# Patient Record
Sex: FEMALE | Race: OTHER | NOT HISPANIC OR LATINO
[De-identification: names, ages, dates, MRNs, and addresses within clinical notes are randomized per-mention and may not be internally consistent; named-entity substitution may affect disease eponyms.]

---

## 2021-03-21 ENCOUNTER — APPOINTMENT (OUTPATIENT)
Age: 26
End: 2021-03-21

## 2022-01-25 PROBLEM — Z00.00 ENCOUNTER FOR PREVENTIVE HEALTH EXAMINATION: Status: ACTIVE | Noted: 2022-01-25

## 2022-02-09 ENCOUNTER — APPOINTMENT (OUTPATIENT)
Dept: GASTROENTEROLOGY | Facility: CLINIC | Age: 27
End: 2022-02-09
Payer: COMMERCIAL

## 2022-02-09 VITALS
TEMPERATURE: 97.9 F | BODY MASS INDEX: 27.47 KG/M2 | SYSTOLIC BLOOD PRESSURE: 121 MMHG | RESPIRATION RATE: 16 BRPM | WEIGHT: 175 LBS | HEART RATE: 113 BPM | DIASTOLIC BLOOD PRESSURE: 70 MMHG | HEIGHT: 67 IN | OXYGEN SATURATION: 98 %

## 2022-02-09 DIAGNOSIS — K51.90 ULCERATIVE COLITIS, UNSPECIFIED, W/OUT COMPLICATIONS: ICD-10-CM

## 2022-02-09 DIAGNOSIS — K51.00 ULCERATIVE (CHRONIC) PANCOLITIS W/OUT COMPLICATIONS: ICD-10-CM

## 2022-02-09 PROCEDURE — 99204 OFFICE O/P NEW MOD 45 MIN: CPT

## 2022-02-09 NOTE — PHYSICAL EXAM
[General Appearance - Alert] : alert [General Appearance - In No Acute Distress] : in no acute distress [Sclera] : the sclera and conjunctiva were normal [PERRL With Normal Accommodation] : pupils were equal in size, round, and reactive to light [Extraocular Movements] : extraocular movements were intact [Outer Ear] : the ears and nose were normal in appearance [Oropharynx] : the oropharynx was normal [Neck Appearance] : the appearance of the neck was normal [Neck Cervical Mass (___cm)] : no neck mass was observed [Jugular Venous Distention Increased] : there was no jugular-venous distention [Thyroid Diffuse Enlargement] : the thyroid was not enlarged [Thyroid Nodule] : there were no palpable thyroid nodules [Abdomen Soft] : soft [Abdomen Tenderness] : non-tender [Abdomen Mass (___ Cm)] : no abdominal mass palpated [Cervical Lymph Nodes Enlarged Posterior Bilaterally] : posterior cervical [Cervical Lymph Nodes Enlarged Anterior Bilaterally] : anterior cervical [Supraclavicular Lymph Nodes Enlarged Bilaterally] : supraclavicular [Axillary Lymph Nodes Enlarged Bilaterally] : axillary [Femoral Lymph Nodes Enlarged Bilaterally] : femoral [Inguinal Lymph Nodes Enlarged Bilaterally] : inguinal [No CVA Tenderness] : no ~M costovertebral angle tenderness [No Spinal Tenderness] : no spinal tenderness [Abnormal Walk] : normal gait [Nail Clubbing] : no clubbing  or cyanosis of the fingernails [Musculoskeletal - Swelling] : no joint swelling seen [Motor Tone] : muscle strength and tone were normal [Skin Color & Pigmentation] : normal skin color and pigmentation [Skin Turgor] : normal skin turgor [] : no rash [Deep Tendon Reflexes (DTR)] : deep tendon reflexes were 2+ and symmetric [Sensation] : the sensory exam was normal to light touch and pinprick [No Focal Deficits] : no focal deficits [Oriented To Time, Place, And Person] : oriented to person, place, and time [Impaired Insight] : insight and judgment were intact [Affect] : the affect was normal

## 2022-02-11 NOTE — CONSULT LETTER
[Dear  ___] : Dear  [unfilled], [Courtesy Letter:] : I had the pleasure of seeing your patient, [unfilled], in my office today. [Please see my note below.] : Please see my note below. [Sincerely,] : Sincerely, [FreeTextEntry3] : Brain Mederos MD\par Associate Professor of Medicine\par Chief of GI\par Director IBD Program\par [facility]\par

## 2022-02-11 NOTE — ASSESSMENT
[FreeTextEntry1] : 26F PMHx Proctosigmoid UC (Diagnosed (1/2/9/2021), non-responder to Budesonide and rectal Mesalamine, on Prednisone taper in conjunction with PO Mesalamine and z2ybkhen Entyvio, presenting to IBD clinic, referred by Dr Jero Son (GI) for management of her UC.\par \par #Ulcerative Proctosigmoiditis\par  With Ulcerative Proctosigmoiditis, diagnosed Jan 2021, s/p rectal & PO Mesalamine, non-responder --> PO Mesalmine & Budesonide, non-responder, PO Mesalamine & prednisone no benefit (refractory to steroids) ---> Currently on PO Mesalamine & Entyvio q8 weeks with some clinical response, variable in between infusions \par \par - Obtain outside records of prior EGD/Colonoscopy (January 2021) and Flexible Sigmoidoscopy (Summer 2021)\par - Initial reported symptoms of non-bloody diarrhea, abdominal pain, an atypical presentation for UC, recommend MRE to further evaluate for small bowel involvement \par - With steroid refractory disease, recommended discontinuation of Prednisone \par - c/w Mesalamine PO\par - In the interim, will increase current frequency of Entyvio from q8 weeks to q4 weeks and re-evaluate her symptoms for another two months on new dosing regimen and repeat a flexible sigmoidoscopy with outpatient gastroenterologist in approximately 2 months time\par - Pending results of repeat flex sig, can determine if continuation of current regimen is most appropriate if with endoscopic/clinical improvement or if there is a need for possible alternative txs including Stelara vs Ozanimod vs Remicade. \par \par RTC after repeat flex sigmoidoscopy\par \par Stefanie Vu DO\par Gastroenterology Fellow\par \par Time spent on preparation of visit immediately before the encounter patient visit and immediate post visit care was 800 minutes activities pertaining to this visit were: Obtaining and/or reviewing separately obtained history performing a medically appropriate examination and/or evaluation as documented in this encounter note, counseling, and education of the patient, family, or caregiver, ordering medications, tests, procedures, referring and communicating with healthcare professionals, documenting clinical information the patient's electronic medical record, interpreting results not separately reported and communicating results to the patient, family, or caregiver and care coordination.As a new patient, additional time was required to  re: options for meds and communicate with referring physician.\par \par

## 2022-02-11 NOTE — REVIEW OF SYSTEMS
[Negative] : Heme/Lymph [Recent Weight Gain (___ Lbs)] : recent [unfilled] ~Ulb weight gain [Abdominal Pain] : abdominal pain [Diarrhea] : diarrhea [As Noted in HPI] : as noted in HPI [Vomiting] : no vomiting [Constipation] : no constipation [Heartburn] : no heartburn [Melena] : no melena [FreeTextEntry7] : bloody BMs, incontinence, urgency

## 2022-02-11 NOTE — HISTORY OF PRESENT ILLNESS
[de-identified] : 26F PMHx Proctosigmoid UC (Diagnosed (1/2/9/2021), non-responder to Budesonide and rectal Mesalamine, on Prednisone taper in conjunction with PO Mesalamine and q3einofv Entyvio, presenting to IBD clinic, referred by Dr Jero Son (GI) for management of her UC.\par \par Symptoms first began in 2020, noted that around Jan 2020, developed a flu like illness, shortly afterwards developed diarrhea. Had been following with her childhood gastroenterologist up until this point for management of her GERD, had been recommended to see Dr Jero Son when her symptoms worsened in October 2020 with newly reported LLQ abdominal pain and worsening diarrhea (4-5x daily, non-bloody).  Full workup done at the time, including CT, stools studies, BW. Recommended a colonosocpy then, which was performed in conjunction with an EGD in January 2021. Prior to her EGD/Colonoscopy, she took Zifaxin for x 14 days to empirically treat for an infection, which stool studies were negative for any infection at the time. Of note, noted to have a fecal calprotectin of 547. At the time of her first colonoscopy evaluation, she was noted to have UC localized to the rectum. After her colonoscopy, she was started on Mesalamine rectal and Mesalamine tablets (Lialda, 4.8 mg daily). Stopped the mesalamine rectal in June 2021, still noting diarrhea as well as pain however continued with PO mesalamine. First noted blood in her stools in February 2021. \par \par On July 2021, was placed on Budesonide PO in conjunction with with mesalamine PO. Noting going to the bathroom 10+/day. Previously noted to be 4-5 BMs/day. No weight loss up until this point, in fact reporting weight gain (even prior to initiation of Prednisone). July 30,2021, underwent a flex sigmoidoscopy, which revealed severe active colitis in sigmoid and moderate chronic active colitis in the rectum. Both negative for dysplasia and malignancy. Cdiff also collected then which was negative. Reports taking Imodium intermittently which had somewhat helped. Also noted that during the summer, July 2021, noted to become more fatigued and developed fecal incontinence with mucous noted in her stools. Was on Budesonide up until  August but continued on Mesalamine. Prednisone 40 mg daily started then, tapered every 5 mg every 5 days until reached 20 mg, stayed on that dose for a while up until she developed anxiety and was decreased to 10 mg daily and now currently on 5 mg daily. \par \par Entyvio was started on 9/3/2021, 0,2,6 loading dose, now every 8 weeks. \par \par Also receiving Fe infusions since October 2021 for NICOLE (Ferritin 4, Fe sat 5%), last one was 8 weeks ago. \par \par Current sxs: Notes that after every Entyvio infusion, she feels 80-90% better for the first two days, then notes this decreases to a 40% improvement a few days after her infusions. In the days leading up to her infusion though, she reports her BMs to increase to up to 10+ bloody BMs/day with an associated sensation of urgency, incontinence.  In general reports her afternoons to be better than mornings as far as symptoms. Notes that is afraid to be far away from a bathroom at all times. Last infusion last Thursday, 2/3/22.  Also has noted an approximate 25 lb weight gain since her symptoms first began early 2020 (150 --> 175).\par \par PMHx: UC, GERD\par PSHx: None\par Allergies: Tetracycline, Azithromycin (rash), seasonal allergies\par Meds: Oral mesalamine, Entyvio, Dexelint (GERD), Famotidine (GERD)\par SH: Drinks 1-2x/month, never smoker\par FH: UC (Paternal GM, sister), CRC (patient not sure which relative)\par \par Relevant labs/tests:\par CRP 16.1 (H) - 12/17/2021\par \par

## 2022-02-15 ENCOUNTER — TRANSCRIPTION ENCOUNTER (OUTPATIENT)
Age: 27
End: 2022-02-15

## 2022-02-16 ENCOUNTER — TRANSCRIPTION ENCOUNTER (OUTPATIENT)
Age: 27
End: 2022-02-16

## 2022-02-24 ENCOUNTER — RESULT REVIEW (OUTPATIENT)
Age: 27
End: 2022-02-24

## 2022-02-24 ENCOUNTER — OUTPATIENT (OUTPATIENT)
Dept: OUTPATIENT SERVICES | Facility: HOSPITAL | Age: 27
LOS: 1 days | End: 2022-02-24
Payer: COMMERCIAL

## 2022-02-24 ENCOUNTER — APPOINTMENT (OUTPATIENT)
Dept: MRI IMAGING | Facility: HOSPITAL | Age: 27
End: 2022-02-24

## 2022-02-24 PROCEDURE — 74183 MRI ABD W/O CNTR FLWD CNTR: CPT | Mod: 26

## 2022-02-24 PROCEDURE — A9585: CPT

## 2022-02-24 PROCEDURE — 72197 MRI PELVIS W/O & W/DYE: CPT

## 2022-02-24 PROCEDURE — 74183 MRI ABD W/O CNTR FLWD CNTR: CPT

## 2022-02-24 PROCEDURE — 72197 MRI PELVIS W/O & W/DYE: CPT | Mod: 26

## 2022-03-07 ENCOUNTER — TRANSCRIPTION ENCOUNTER (OUTPATIENT)
Age: 27
End: 2022-03-07

## 2022-05-16 ENCOUNTER — NON-APPOINTMENT (OUTPATIENT)
Age: 27
End: 2022-05-16

## 2022-05-22 ENCOUNTER — NON-APPOINTMENT (OUTPATIENT)
Age: 27
End: 2022-05-22

## 2022-06-15 ENCOUNTER — APPOINTMENT (OUTPATIENT)
Dept: GASTROENTEROLOGY | Facility: CLINIC | Age: 27
End: 2022-06-15
Payer: COMMERCIAL

## 2022-06-15 PROCEDURE — 99214 OFFICE O/P EST MOD 30 MIN: CPT | Mod: 95

## 2022-06-16 NOTE — HISTORY OF PRESENT ILLNESS
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 \par \par On last visit patient reported clinical response with VDZ, variable in-between the infusions, we recommended increasing the VDZ infusions to q4 weeks, come for follow up today regarding further management. \par \par Seen by Grady Mata recently  - VDZ dose escalation to q4 did not lead to resolution of symptoms, CRP still elevated and Continous inflammation noted on flex sig.\par \par Pathology - Moderately active chronic colitis \par \par MRE 02/2022 - Proctocolitis involving the sigmoid colon and rectum in continuos fashion\par \par Patient reports still having symptoms of blood in stools, urgency. She is working remotely but symptoms still causing problems in her day to day life. Concerned that VDZ is not working, no longer on steroids. Of note she tested positive for COVID in 5/202 but her GI symptoms have preceded this. \par \par Previous history \par Symptoms first began in 2020, noted that around Jan 2020, developed a flu like illness, shortly afterwards developed diarrhea. Had been following with her childhood gastroenterologist up until this point for management of her GERD, had been recommended to see Dr Jero Son when her symptoms worsened in October 2020 with newly reported LLQ abdominal pain and worsening diarrhea (4-5x daily, non-bloody). Full workup done at the time, including CT, stools studies, BW. Recommended a colonosocpy then, which was performed in conjunction with an EGD in January 2021. Prior to her EGD/Colonoscopy, she took Zifaxin for x 14 days to empirically treat for an infection, which stool studies were negative for any infection at the time. Of note, noted to have a fecal calprotectin of 547. At the time of her first colonoscopy evaluation, she was noted to have UC localized to the rectum. After her colonoscopy, she was started on Mesalamine rectal and Mesalamine tablets (Lialda, 4.8 mg daily). Stopped the mesalamine rectal in June 2021, still noting diarrhea as well as pain however continued with PO mesalamine. First noted blood in her stools in February 2021. \par \par On July 2021, was placed on Budesonide PO in conjunction with with mesalamine PO. Noting going to the bathroom 10+/day. Previously noted to be 4-5 BMs/day. No weight loss up until this point, in fact reporting weight gain (even prior to initiation of Prednisone). July 30,2021, underwent a flex sigmoidoscopy, which revealed severe active colitis in sigmoid and moderate chronic active colitis in the rectum. Both negative for dysplasia and malignancy. Cdiff also collected then which was negative. Reports taking Imodium intermittently which had somewhat helped. Also noted that during the summer, July 2021, noted to become more fatigued and developed fecal incontinence with mucous noted in her stools. Was on Budesonide up until August but continued on Mesalamine. Prednisone 40 mg daily started then, tapered every 5 mg every 5 days until reached 20 mg, stayed on that dose for a while up until she developed anxiety and was decreased to 10 mg daily and now currently on 5 mg daily. \par \par Entyvio was started on 9/3/2021, 0,2,6 loading dose, now every 8 weeks. \par \par Also receiving Fe infusions since October 2021 for NICOLE (Ferritin 4, Fe sat 5%), last one was 8 weeks ago. \par \par Current sxs: Notes that after every Entyvio infusion, she feels 80-90% better for the first two days, then notes this decreases to a 40% improvement a few days after her infusions. In the days leading up to her infusion though, she reports her BMs to increase to up to 10+ bloody BMs/day with an associated sensation of urgency, incontinence. In general reports her afternoons to be better than mornings as far as symptoms. Notes that is afraid to be far away from a bathroom at all times. Last infusion last Thursday, 2/3/22. Also has noted an approximate 25 lb weight gain since her symptoms first began early 2020 (150 --> 175).\par \par PMHx: UC, GERD\par PSHx: None\par Allergies: Tetracycline, Azithromycin (rash), seasonal allergies\par Meds: Oral mesalamine, Entyvio, Dexelint (GERD), Famotidine (GERD)\par SH: Drinks 1-2x/month, never smoker\par FH: UC (Paternal GM, sister), CRC (patient not sure which relative)\par \par Relevant labs/tests:\par CRP 16.1 (H) - 12/17/2021

## 2022-06-16 NOTE — REASON FOR VISIT
[Home] : at home, [unfilled] , at the time of the visit. [Medical Office: ([facility])___] : at the medical office located in  [Spouse] : spouse [Follow-Up: _____] : a [unfilled] follow-up visit

## 2022-06-16 NOTE — ASSESSMENT
[FreeTextEntry1] : 26 F PMHx Proctosigmoid UC (Diagnosed (1/2/9/2021), non-responder to Budesonide and rectal Mesalamine, on Prednisone taper in conjunction with PO Mesalamine and q8 weekly Entyvio (started 9/2021), last seen 02/2022, referred by Dr Jero Son (GI) for management of her UC. On last visit patient reported clinical response with VDZ, variable in-between the infusions, we recommended increasing the VDZ infusions to q4 weeks, come for follow up today regarding further management.  poor QOL.\par \par #UC Proctosigmoiditis. \par Diagnosed Jan 2021, s/p rectal & PO Mesalamine, non-responder --> PO Mesalmine & Budesonide, non-responder, PO Mesalamine & prednisone no benefit (refractory to steroids) ---> Currently on PO Mesalamine & Entyvio q8 weeks with some clinical response, variable in between infusions --- VDZ changed to q4 weeks \par \par Seen by Huy Mata recently  - VDZ dose escalation to q4 did not lead to resolution of symptoms, CRP still elevated and Continous inflammation noted on flex sig.\par \par - Patients initial symptoms of non-bloody diarrhea, abdominal pain, an atypical presentation of UC, we had recommended an MRE to r/o any small bowel disease - Negative, only showed proctosigmoiditis.\par \par - Given no response even after escalating VDZ dose - patient will need alternative treatment. Discussed the same with the patient. Possible therapies include - IFX vs UST. Same was discussed with the patient and she choose to be started on IFX.  Xeljanz is not an option at this time as its indicated after an inadequate response or intolerance to TNF. Anticipate should be able to determine benefit of new therapy within 3-4mo. \par \par - Continue with PO Mesalamine \par \par - f/u with  Dr. Son, out team remains available for any further assistance in her management \par \par RTC PRN \par \par Lolly Saunders \par [facility] IBD Fellow \par

## 2022-06-16 NOTE — CONSULT LETTER
[Dear  ___] : Dear  [unfilled], [Courtesy Letter:] : I had the pleasure of seeing your patient, [unfilled], in my office today. [Please see my note below.] : Please see my note below. [Consult Closing:] : Thank you very much for allowing me to participate in the care of this patient.  If you have any questions, please do not hesitate to contact me. [Sincerely,] : Sincerely, [FreeTextEntry3] : Brain Mederos MD\par Associate Professor of Medicine\par Chief of GI\par Director IBD Program\par [facility]\par

## 2022-09-06 ENCOUNTER — NON-APPOINTMENT (OUTPATIENT)
Age: 27
End: 2022-09-06

## 2022-11-15 ENCOUNTER — NON-APPOINTMENT (OUTPATIENT)
Age: 27
End: 2022-11-15

## 2022-11-30 ENCOUNTER — APPOINTMENT (OUTPATIENT)
Dept: GASTROENTEROLOGY | Facility: CLINIC | Age: 27
End: 2022-11-30

## 2022-11-30 VITALS
WEIGHT: 162 LBS | BODY MASS INDEX: 25.43 KG/M2 | SYSTOLIC BLOOD PRESSURE: 118 MMHG | RESPIRATION RATE: 16 BRPM | HEART RATE: 112 BPM | DIASTOLIC BLOOD PRESSURE: 78 MMHG | HEIGHT: 67 IN | OXYGEN SATURATION: 98 % | TEMPERATURE: 98.1 F

## 2022-11-30 PROCEDURE — 99215 OFFICE O/P EST HI 40 MIN: CPT

## 2022-11-30 RX ORDER — MULTIVITAMIN
TABLET ORAL
Refills: 0 | Status: ACTIVE | COMMUNITY

## 2022-11-30 RX ORDER — CLINDAMYCIN PHOSPHATE 1 G/10ML
1 GEL TOPICAL
Qty: 60 | Refills: 0 | Status: ACTIVE | COMMUNITY
Start: 2021-11-10

## 2022-11-30 RX ORDER — ESCITALOPRAM OXALATE 10 MG/1
10 TABLET ORAL
Qty: 30 | Refills: 0 | Status: ACTIVE | COMMUNITY
Start: 2022-11-07

## 2022-11-30 RX ORDER — FLUOCINOLONE ACETONIDE 0.1 MG/ML
0.01 SOLUTION TOPICAL
Qty: 60 | Refills: 0 | Status: ACTIVE | COMMUNITY
Start: 2022-09-08

## 2022-11-30 RX ORDER — ONDANSETRON 4 MG/1
4 TABLET ORAL
Qty: 9 | Refills: 0 | Status: ACTIVE | COMMUNITY
Start: 2021-11-05

## 2022-11-30 RX ORDER — FAMOTIDINE 20 MG/1
20 TABLET, FILM COATED ORAL
Refills: 0 | Status: ACTIVE | COMMUNITY

## 2022-11-30 RX ORDER — DEXLANSOPRAZOLE 60 MG/1
60 CAPSULE, DELAYED RELEASE ORAL
Qty: 90 | Refills: 0 | Status: ACTIVE | COMMUNITY
Start: 2022-11-18

## 2022-11-30 RX ORDER — TRIAMCINOLONE ACETONIDE 1 MG/G
0.1 CREAM TOPICAL
Qty: 80 | Refills: 0 | Status: ACTIVE | COMMUNITY
Start: 2022-09-08

## 2022-12-03 NOTE — HISTORY OF PRESENT ILLNESS
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 Started Inflectra in June, and despite escalated dosing she continues to remain symptomatic. Accompanied by her mother today. \par \par Pt reports daily fecal incontinence. Significant urgency. + blood in stool 75% of the time. Having up to 20BMs per day on her worst days, best day 4-5; having more bad days than good. + abd pain with BMs. No n/v. + low grade fevers (99.9-100.6F). Takes tylenol prn. Avoids NSAIDs. + weight loss of 7 lbs ( had prevoiusly gained weight, perhaps in setting of steroids). Now back on steroids since the summer at least 10mg since; causes sleeplessness if on higher dosages. + nocturnal symptoms to have a BM. \par \par EIMs: joint pain all over mostly in hip and low back; rash; no apthous ulcers \par \par Remains on Dexilant for heartburn control. Reports if she's off of it for several days she gets reflux. H2 blockers in between with relief. \par \par Does reports itchy rash throughout skin. Saw derm who said it was psoriasis. Was given topical creams with relief. Dermatologist information: [facility]\par \par Also reports cough that has been ongoing for 6 weeks. Taking Mucinex as it's productive. Negative flu and COVID test. 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 Had been following with her childhood gastroenterologist up until this point for management of her GERD, had been recommended to see Dr Jero Son when her symptoms worsened in October 2020 with newly reported LLQ abdominal pain and worsening diarrhea (4-5x daily, non-bloody). Full workup done at the time, including CT, stools studies, BW. Recommended a colonoscopy then, which was performed in conjunction with an EGD in January 2021. Prior to her EGD/Colonoscopy, she took xifaxin for x 14 days to empirically treat for an infection, which stool studies were negative for any infection at the time. Of note, noted to have a fecal calprotectin of 547. At the time of her first colonoscopy evaluation, she was noted to have UC localized to the rectum. After her colonoscopy, she was started on Mesalamine rectal and Mesalamine tablets (Lialda, 4.8 mg daily). Stopped the mesalamine rectal in June 2021, still noting diarrhea as well as pain however continued with PO mesalamine. First noted blood in her stools in February 2021. \par \par On July 2021, was placed on Budesonide PO in conjunction with with mesalamine PO. Noting going to the bathroom 10+/day. Previously noted to be 4-5 BMs/day. No weight loss up until this point, in fact reporting weight gain (even prior to initiation of Prednisone). July 30,2021, underwent a flex sigmoidoscopy, which revealed severe active colitis in sigmoid and moderate chronic active colitis in the rectum. Both negative for dysplasia and malignancy. Cdiff also collected then which was negative. Reports taking Imodium intermittently which had somewhat helped. Also noted that during the summer, July 2021, noted to become more fatigued and developed fecal incontinence with mucous noted in her stools. Was on Budesonide up until August but continued on Mesalamine. Prednisone 40 mg daily started then, tapered every 5 mg every 5 days until reached 20 mg, stayed on that dose for a while up until she developed anxiety and was decreased to 10 mg daily and now currently on 5 mg daily. \par \par Entyvio was started on 9/3/2021\par PMHx: UC, GERD\par PSHx: None\par Allergies: Tetracycline, Azithromycin (rash), seasonal allergies\par Meds: Oral mesalamine, Entyvio, Dexelint (GERD), Famotidine (GERD)\par SH: Drinks 1-2x/month, never smoker\par FH: UC (Paternal GM, sister), CRC (patient not sure which relative)\par \par  [de-identified] : blood in stools and urgency

## 2022-12-03 NOTE — CONSULT LETTER
[Dear  ___] : Dear  [unfilled], [Courtesy Letter:] : I had the pleasure of seeing your patient, [unfilled], in my office today. [Please see my note below.] : Please see my note below. [Consult Closing:] : Thank you very much for allowing me to participate in the care of this patient.  If you have any questions, please do not hesitate to contact me. [Sincerely,] : Sincerely, [FreeTextEntry3] : Brain Mederos MD\par Associate Professor of Medicine\par Chief of GI\par Director IBD Program\par [facility]\par

## 2022-12-03 NOTE — ASSESSMENT
[FreeTextEntry1] : 27 Y F PMHx Proctosigmoid UC (Diagnosed (1/2/9/2021), non-responder to Budesonide and rectal Mesalamine, on Prednisone in conjunction with PO Mesalamine and q 4 weekly Inflectra since June 2022; 8 weekly Entyvio (started 9/2021), last seen 02/2022, referred by Dr Jero Son (GI) for management of her UC. \par \par #UC Proctosigmoiditis. \par - Diagnosed Jan 2021, s/p rectal & PO Mesalamine, non-responder --> PO Mesalmine & Budesonide, non-responder, PO Mesalamine & prednisone no benefit (refractory to steroids) ---> Currently on PO Mesalamine & Inflectra q 4 weeks with no response; previous non-responder VDZ \par - advised she stop Inflectra given no improvement and rash developed \par - counseled patient on Rinvoq as this the best next step in UC management to prevent further complications given time to efficacy is superior than other drugs currently on the market; pt is facing risk fo hospitalization and possible colectomy and we want to give her the best chance at medical therapy; pt reports she's been vaccinated to Varicella therefore no shingrix vaccine necessary\par - We discussed the potential risks of Ozanimod (Zeposia) treatment in detail with the patient. This included the increased risk of infections, which can be mitigated by checking for Tuberculosis and Hepatitis exposure and encouraging up to date immunizations with Shingrix, COVID-19, the annual flu, pneumococcal, and all other appropriate vaccine preventable illnesses prior to starting therapy. Live vaccines should be avoided while on therapy. We discussed the increased risk of arrhythmias and/or bradycardia especially in those patients with a prior history of such and to notify the provider with any signs/symptoms of arrhythmia or bradycardia including lightheadedness, dizziness, syncope, shortness of breath, palpitations, or chest pain. Baseline EKG to be obtained prior to starting therapy for all patients and cardiology evaluation for those with history of heart failure, MI, or arrhythmias. Prior to starting therapy, ophthalmic exam is to be obtained for all diabetic patients or those with past history of uveitis or macular edema. Prior to starting therapy, pulmonary function test should be obtained for patients with history of significant lung disease. Risks including, but not limited to, elevated liver enzymes, lymphopenia, decline in pulmonary function, hypertension, and macular edema were discussed. Given the increased risk of elevated liver enzymes, baseline liver enzymes will be obtained and monitored periodically. For women of childbearing age intending to conceive within 3 months or are planning to breastfeed, the patient understands this medication may be teratogenic and contraception or abstinence is required for at least 3 months. Any plans for pregnancy should be shared with ample time to discontinue the medication and find an alternative.  All the patient's questions and concerns were discussed in detail.\par - check labs today to trend CRP and rule out infection with stool studies - if normal calpro consider looking via flex sig \par - counseled to remain on steroids for now, until starting therapy and going into clinical remission \par - Patients initial symptoms of non-bloody diarrhea, abdominal pain, an atypical presentation of UC, we had recommended an MRE to r/o any small bowel disease - Negative, only showed proctosigmoiditis. Now patient has been having bleeding \par - Continue with PO Mesalamine and OH for now\par - referral to Colorectal team to discuss surgical options\par \par HCM\par - cont f/u therapist for depression\par - received varicella vaccine as child, no need for shingles vaccine\par - will need to  on flu/pna vaccine at next visit\par - will need DEXA scan\par - no tobacco use or NSAID use\par - b12, iron and vit d when stable GI symptoms \par \par F/U 2 weeks after starting Rinvoq.\par I spoke to Dr. Son who concurs with plan.

## 2022-12-03 NOTE — PHYSICAL EXAM
[General Appearance - Alert] : alert [General Appearance - In No Acute Distress] : in no acute distress [Oriented To Time, Place, And Person] : oriented to person, place, and time [Alert] : alert [Normal Voice/Communication] : normal voice/communication [Well Nourished] : well nourished [Normal Appearance] : the appearance of the neck was normal [No Respiratory Distress] : no respiratory distress [No Masses] : no abdominal mass palpated [Abdomen Soft] : soft [Normal Color / Pigmentation] : normal skin color and pigmentation [General Appearance - Well-Appearing] : healthy appearing [] : normal voice and communication [de-identified] : left quadrant tenderness

## 2022-12-08 DIAGNOSIS — A07.2 CRYPTOSPORIDIOSIS: ICD-10-CM

## 2022-12-08 LAB
ALBUMIN SERPL ELPH-MCNC: 4.8 G/DL
ALP BLD-CCNC: 49 U/L
ALT SERPL-CCNC: 11 U/L
ANION GAP SERPL CALC-SCNC: 13 MMOL/L
AST SERPL-CCNC: 13 U/L
BASOPHILS # BLD AUTO: 0.13 K/UL
BASOPHILS NFR BLD AUTO: 1 %
BILIRUB SERPL-MCNC: 0.3 MG/DL
BUN SERPL-MCNC: 8 MG/DL
C DIFF TOXIN B QL PCR REFLEX: NORMAL
CALCIUM SERPL-MCNC: 9.8 MG/DL
CALPROTECTIN FECAL: 1924 UG/G
CHLORIDE SERPL-SCNC: 102 MMOL/L
CO2 SERPL-SCNC: 24 MMOL/L
CREAT SERPL-MCNC: 0.66 MG/DL
CRP SERPL-MCNC: 3 MG/L
CRYPTOSPORIDIUM: DETECTED
EGFR: 122 ML/MIN/1.73M2
EOSINOPHIL # BLD AUTO: 0.24 K/UL
EOSINOPHIL NFR BLD AUTO: 1.9 %
GDH ANTIGEN: NOT DETECTED
GI PCR PANEL: DETECTED
GLUCOSE SERPL-MCNC: 97 MG/DL
HCT VFR BLD CALC: 47.6 %
HGB BLD-MCNC: 14.9 G/DL
IMM GRANULOCYTES NFR BLD AUTO: 1.5 %
LYMPHOCYTES # BLD AUTO: 1.73 K/UL
LYMPHOCYTES NFR BLD AUTO: 13.5 %
MAN DIFF?: NORMAL
MCHC RBC-ENTMCNC: 30.2 PG
MCHC RBC-ENTMCNC: 31.3 GM/DL
MCV RBC AUTO: 96.6 FL
MONOCYTES # BLD AUTO: 0.54 K/UL
MONOCYTES NFR BLD AUTO: 4.2 %
NEUTROPHILS # BLD AUTO: 10.02 K/UL
NEUTROPHILS NFR BLD AUTO: 77.9 %
PLATELET # BLD AUTO: 465 K/UL
POTASSIUM SERPL-SCNC: 4.3 MMOL/L
PROT SERPL-MCNC: 7.3 G/DL
RBC # BLD: 4.93 M/UL
RBC # FLD: 13.1 %
SODIUM SERPL-SCNC: 138 MMOL/L
TOXIN A AND B: NOT DETECTED
WBC # FLD AUTO: 12.85 K/UL

## 2022-12-16 ENCOUNTER — APPOINTMENT (OUTPATIENT)
Dept: COLORECTAL SURGERY | Facility: CLINIC | Age: 27
End: 2022-12-16

## 2022-12-16 VITALS
OXYGEN SATURATION: 97 % | HEART RATE: 94 BPM | DIASTOLIC BLOOD PRESSURE: 80 MMHG | HEIGHT: 67 IN | SYSTOLIC BLOOD PRESSURE: 122 MMHG | BODY MASS INDEX: 25.43 KG/M2 | WEIGHT: 162 LBS | TEMPERATURE: 98.1 F

## 2022-12-16 PROCEDURE — 99205 OFFICE O/P NEW HI 60 MIN: CPT

## 2022-12-16 RX ORDER — PERMETHRIN 50 MG/G
5 CREAM TOPICAL
Qty: 60 | Refills: 0 | Status: COMPLETED | COMMUNITY
Start: 2022-09-06 | End: 2022-12-16

## 2022-12-16 RX ORDER — INFLIXIMAB-DYYB 100 MG/10ML
100 INJECTION, POWDER, LYOPHILIZED, FOR SOLUTION INTRAVENOUS
Refills: 0 | Status: COMPLETED | COMMUNITY
End: 2022-12-16

## 2022-12-16 RX ORDER — NITAZOXANIDE 500 MG/1
500 TABLET, FILM COATED ORAL
Qty: 6 | Refills: 0 | Status: COMPLETED | COMMUNITY
Start: 2022-12-08 | End: 2022-12-16

## 2022-12-16 NOTE — HISTORY OF PRESENT ILLNESS
[FreeTextEntry1] : 27 year old female pt referred by Dr. Mederos for proctosigmoid colitis diagnosed 1/2021 and has failed 2 biologic drugs.\par \par Last Flexible sigmoidoscopy 4/15/22- proctosigmoid colitis\par \par Pt has weight loss and worsening symptoms. Here to discuss surgical options\par \par Has fecal urgency, incontinence, frequency, abdominal pain as well as body aches\par Bleeding with 75% of BM and mucus in stools\par 15-20 stools a day\par Waking up in the middle of the night\par Just started Rinvok this week. Been 2 years of worsening symptoms.  Been on medical leave from work and impacting your life\par Wants to understand her surgical options. 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 Sending it to [facility] for confirmation\par

## 2022-12-16 NOTE — ASSESSMENT
[FreeTextEntry1] : 27 F w/ medically refractory UC (mainly distal in site); recently started on Rinvoq.\par I discussed surgical options - TPC + j-pouch; role of ileostomy; 2-stage/3-stage approach.\par Possible complications discussed.\par All questions answered.\par Plan:\par Continue with treatment per Dr Mederos's advice.\par see again as needed.\par

## 2022-12-16 NOTE — PHYSICAL EXAM
[Abdomen Masses] : No abdominal masses [Abdomen Tenderness] : ~T ~M Abdominal tenderness [No HSM] : no hepatosplenomegaly [No Rash or Lesion] : No rash or lesion [Alert] : alert [Oriented to Person] : oriented to person [Oriented to Place] : oriented to place [Oriented to Time] : oriented to time [Calm] : calm [de-identified] : Soft; tenderness on the right side [de-identified] : External normal; NICCI normal [de-identified] : Normal [de-identified] : Normal [de-identified] : Normal [de-identified] : Normal [de-identified] : Normal

## 2022-12-22 ENCOUNTER — EMERGENCY (EMERGENCY)
Facility: HOSPITAL | Age: 27
LOS: 1 days | Discharge: HOME | End: 2022-12-22
Attending: EMERGENCY MEDICINE | Admitting: EMERGENCY MEDICINE
Payer: COMMERCIAL

## 2022-12-22 ENCOUNTER — NON-APPOINTMENT (OUTPATIENT)
Age: 27
End: 2022-12-22

## 2022-12-22 VITALS
HEIGHT: 67 IN | WEIGHT: 162.04 LBS | RESPIRATION RATE: 18 BRPM | TEMPERATURE: 99 F | OXYGEN SATURATION: 95 % | DIASTOLIC BLOOD PRESSURE: 87 MMHG | HEART RATE: 106 BPM | SYSTOLIC BLOOD PRESSURE: 128 MMHG

## 2022-12-22 VITALS
TEMPERATURE: 99 F | DIASTOLIC BLOOD PRESSURE: 71 MMHG | HEART RATE: 98 BPM | SYSTOLIC BLOOD PRESSURE: 120 MMHG | RESPIRATION RATE: 16 BRPM | OXYGEN SATURATION: 98 %

## 2022-12-22 DIAGNOSIS — R20.2 PARESTHESIA OF SKIN: ICD-10-CM

## 2022-12-22 DIAGNOSIS — M79.605 PAIN IN LEFT LEG: ICD-10-CM

## 2022-12-22 DIAGNOSIS — Z88.1 ALLERGY STATUS TO OTHER ANTIBIOTIC AGENTS STATUS: ICD-10-CM

## 2022-12-22 DIAGNOSIS — M79.632 PAIN IN LEFT FOREARM: ICD-10-CM

## 2022-12-22 DIAGNOSIS — K51.90 ULCERATIVE COLITIS, UNSPECIFIED, WITHOUT COMPLICATIONS: ICD-10-CM

## 2022-12-22 DIAGNOSIS — Z83.79 FAMILY HISTORY OF OTHER DISEASES OF THE DIGESTIVE SYSTEM: ICD-10-CM

## 2022-12-22 PROCEDURE — 93971 EXTREMITY STUDY: CPT

## 2022-12-22 PROCEDURE — 93971 EXTREMITY STUDY: CPT | Mod: 26,59,LT

## 2022-12-22 PROCEDURE — 99284 EMERGENCY DEPT VISIT MOD MDM: CPT

## 2022-12-22 PROCEDURE — 99284 EMERGENCY DEPT VISIT MOD MDM: CPT | Mod: 25

## 2022-12-22 NOTE — ED PROVIDER NOTE - NSFOLLOWUPINSTRUCTIONS_ED_ALL_ED_FT
The cause of your symptoms is not clear at this time.  Keep your telehealth appointment as scheduled for next week, and call your primary care provider or Dr Mederos tomorrow.      If you develop any new or worsening symptoms, return to ER right away.

## 2022-12-22 NOTE — ED PROVIDER NOTE - NS ED ATTENDING STATEMENT MOD
This was a shared visit with the SOTO. I reviewed and verified the documentation and independently performed the documented:

## 2022-12-22 NOTE — CHART NOTE - NSCHARTNOTEFT_GEN_A_CORE
Briefly, patient is a 26 yo F Proctosigmoid UC on Rinvoq started 9 days ago, c/o left sided extremity numbness / pain / paresthesias    She was sent to ED to eval for VTE, given association with ANNIE inhibitors and VTE    Dopplers were negative for DVT here, and plan is to DC home with close GI follow-up    She was instructed to hold her Rinvoq for now, and we will discuss further management early next week    Chris Roman M.D.  Gastroenterology Fellow  Weekday Pager: [phone]  Weeknights/Weekend Coverage: Please Call the  for contact info

## 2022-12-22 NOTE — ED PROVIDER NOTE - ATTENDING APP SHARED VISIT CONTRIBUTION OF CARE
28 y/o F with hx UC on Rinvoq which has a profile of increase risk for DVt presents with c/o L UE and LE tingling. Sent to the ED by GI for c/o o DVT. this was r/o on left Upper and lower ext doppler. GI consulted pt to hold use of Rinvoq and f/u as an out pt. 26 y/o F with hx UC on Rinvoq which has a profile of increase risk for DVt presents with c/o L UE and LE tingling. Sent to the ED by GI for c/o o DVT. this was r/o on left Upper and lower ext doppler. on exam, no swelling or tenderness of left upper and lower ext. GI consulted pt to hold use of Rinvoq and f/u as an out pt.

## 2022-12-22 NOTE — ED PROVIDER NOTE - PATIENT PORTAL LINK FT
You can access the FollowMyHealth Patient Portal offered by [facility] by registering at the following website: [email or web address]. By joining [email or web address]’s FollowMyHealth portal, you will also be able to view your health information using other applications (apps) compatible with our system.

## 2022-12-22 NOTE — ED PROVIDER NOTE - PHYSICAL EXAMINATION
CONSTITUTIONAL: NAD   SKIN: Normal color and turgor.    HEAD: NC/AT.  EYES: Conjunctiva clear. EOMI. PERRL. No nystagmus.   ENT: Airway clear. Normal voice.   RESPIRATORY:  Normal work of breathing. Lungs CTAB.  CARDIOVASCULAR:  RRR, S1S2. No M/R/G.      GI:  Abdomen soft, nontender.    MSK: Neck supple.  No swelling or muscular tenderness of arms or legs. Strong radial, DP and PT pulses bilat.  NEURO: Alert with clear sensorium; CN: II-XII intact. Speech clear.  NGUYEN.  Strength 5/5 all MMG in all extremities.  SILT throughout.  F-N intact.  Gait steady.

## 2022-12-22 NOTE — ED PROVIDER NOTE - OBJECTIVE STATEMENT
27 f PMHx ulcerative colitis started on Rinvoq apx 2 weeks ago. Experiencing left sided arm and leg paresthesias for past 2 and a half days.  Started as sharp shooting pain in left forearm, now dull and tingly in entire left arm and left leg, no numbness or weakness.  Pain keeps her up at night.  Improves with movement, more apparent to her when sitting still.  No headache, facial numbness, dizziness/balance problems, vision or speech symptoms.  No fever or chills.  No arm or leg swelling. No chest pain, palpitations, SOB, cough, or hemoptysis.  No other acute complaints.  Seen by Dr Mederos today, sent to ED to r/o DVT.      PMHx UC  Family hx: sister with UC, aunt with MS. no family hx of stroke at young age.   Social: no tobacco or illicit drug use

## 2022-12-22 NOTE — ED PROVIDER NOTE - PROGRESS NOTE DETAILS
Seen by GI fellow, no acute intervention from their standpoint.  US neg for DVT.  Pt is well-appearing, NAD.  Has telehealth appointment scheduled next week. Advised pt to return to ED for new/worsening symptoms, and she expressed clear understanding with teach-back. Seen by GI fellow, no acute intervention from their standpoint.  US neg for DVT.  GI advised her to hold Rinvoq for now and they will reassess next week as to whether she should restart it.  Pt is well-appearing, NAD.  Has telehealth appointment scheduled next week. Advised pt to return to ED for new/worsening symptoms, and she expressed clear understanding with teach-back.

## 2022-12-22 NOTE — ED ADULT TRIAGE NOTE - CHIEF COMPLAINT QUOTE
"For the last 2 and a half days I have pain and tingling in my left arm and leg. I'm a patient of Dr. Mederos and they told me to come in because it may be a blood clot."

## 2022-12-22 NOTE — ED ADULT TRIAGE NOTE - OTHER COMPLAINTS
Patient reports she started Rinvoq treatment 1.5-2 weeks ago. Patient reports she started Rinvoq treatment 1.5-2 weeks ago. Denies chest pain.

## 2022-12-22 NOTE — ED PROVIDER NOTE - CLINICAL SUMMARY MEDICAL DECISION MAKING FREE TEXT BOX
Pt with pain/paresthesia of left arm and left leg.  Onset 2 and a half days ago, began after starting Rinvoq 2 weeks ago.  She has no headache, no dizziness, no coordination issues, no vision or speech symptoms, and no motor/sensory deficits on exam. Do not suspect a stroke.  No signs of infection.  Given risk for VTE from Rinvoq, will obtain US to r/o DVT.

## 2022-12-28 ENCOUNTER — APPOINTMENT (OUTPATIENT)
Dept: GASTROENTEROLOGY | Facility: CLINIC | Age: 27
End: 2022-12-28

## 2022-12-28 PROCEDURE — 99213 OFFICE O/P EST LOW 20 MIN: CPT | Mod: 95

## 2022-12-29 NOTE — CONSULT LETTER
[Dear  ___] : Dear  [unfilled], [Courtesy Letter:] : I had the pleasure of seeing your patient, [unfilled], in my office today. [Please see my note below.] : Please see my note below. [Consult Closing:] : Thank you very much for allowing me to participate in the care of this patient.  If you have any questions, please do not hesitate to contact me. [Sincerely,] : Sincerely, [FreeTextEntry3] : Brain Mederos MD\par Associate Professor of Medicine\par Chief of GI\par Director IBD Program\par [facility]\par

## 2022-12-29 NOTE — ASSESSMENT
[FreeTextEntry1] : 27 Y F PMHx Proctosigmoid UC (Diagnosed (1/2/9/2021), non-responder to Budesonide and rectal Mesalamine, on Prednisone in conjunction with PO Mesalamine and q 4 weekly Inflectra since June 2022; 8 weekly Entyvio (started 9/2021), last seen 02/2022, referred by Dr Jero Son (GI) for management of her UC. She felt clinically better on Rinvoq after 10 days, but did notice worsening left arm and left leg pain with neuropathy. ED ruled out blood clot. Improved since stopping Rinvoq. \par \par #UC Proctosigmoiditis. \par - Diagnosed Jan 2021, s/p rectal & PO Mesalamine, non-responder --> PO Mesalmine & Budesonide, non-responder, PO Mesalamine & prednisone no benefit (refractory to steroids) ---> recently on PO Mesalamine & Inflectra q 4 weeks with no response; previous non-responder VDZ \par - transitioned to Rinvoq, and despite improvement, she had possible ADR of "neuropathic pain" which has now improved off treatment\par - pt has worsening GI symptoms and is preferring to go back on Rinvoq; r/b discussed in detail and advised pt stop if neuropathic pain recurs\par - consider neuro referral\par - counseled to remain on steroids for now, until starting therapy and going into clinical remission \par - Patients initial symptoms of non-bloody diarrhea, abdominal pain, an atypical presentation of UC, we had recommended an MRE to r/o any small bowel disease - Negative, only showed proctosigmoiditis. Now patient has been having bleeding \par - Continue with PO Mesalamine and CA for now\par - referral to Colorectal team to discuss surgical options\par \par New onset neuropathc pain in setting of Rinvoq \par - no other known triggers\par - pending B12 result \par - s/p ED visit, ruled out DVT\par - monitor, if recurs back on Rinvoq, stop treatment and consider Neuro referral \par \par Cryptosporidium detected - confirmed with dept of health that this was false positive, pt notified \par \par HCM\par - cont f/u therapist for depression\par - received varicella vaccine as child, no need for shingles vaccine\par - will need to  on flu/pna vaccine at next visit\par - will need DEXA scan\par - no tobacco use or NSAID use\par - b12, iron and vit d when stable GI symptoms \par \par F/U next week as scheduled \par Plan d/w Dr. Mederos

## 2022-12-29 NOTE — HISTORY OF PRESENT ILLNESS
[Home] : at home, [unfilled] , at the time of the visit. [Medical Office: ([facility] [place])___] : at the medical office located in  [Verbal consent obtained from patient] : the patient, [unfilled] [Heartburn] : denies heartburn [Nausea] : denies nausea [Diarrhea] : stable diarrhea [Constipation] : denies constipation [Yellow Skin Or Eyes (Jaundice)] : denies jaundice [Abdominal Pain] : stable abdominal pain [Inflammatory Bowel Disease] : inflammatory bowel disease [FreeTextEntry1] : 27 Y F PMHx Proctosigmoid UC (Diagnosed (1/2/9/2021), non-responder to Budesonide and rectal Mesalamine, on Prednisone taper in conjunction with PO Mesalamine and q4 weekly Inflectra, previously failed Entyvio (started 9/2021), last seen 06/2022, referred by Dr Jero Son (GI) for management of her UC. Started Inflectra in June, and despite escalated dosing she continues to remain symptomatic. Started Rinvoq about 3 weeks ago, but noticed pain in left arm and left leg, with neuropathy. Went to ED to r/o blood clot/stroke which was ruled out. She was advised to hold Rinvoq, last dose 12/22; she reports improvement in neuropathy but worsening GI symptoms.\par \par Pt reports daily fecal incontinence. Significant urgency. + blood in stool 75% of the time. Having up to 40BMs per day on her worst days, best day 4-5; having more bad days than good. + abd pain with BMs. No n/v. Takes tylenol prn. Avoids NSAIDs. + weight loss of 7 lbs ( had preioiusly gained weight, perhaps in setting of steroids). Now back on steroids since the summer at least 10mg since; causes sleeplessness if on higher dosages. + nocturnal symptoms to have a BM. \par \par While on Rinvoq, she did feel better with nights of full rest without interruption. \par \par EIMs: joint pain all over mostly in hip and low back; rash; no apthous ulcers \par \par Remains on Dexilant for heartburn control. Reports if she's off of it for several days she gets reflux. H2 blockers in between with relief. \par \par Does reports itchy rash throughout skin. Saw derm who said it was psoriasis. Was given topical creams with relief. Dermatologist information: [facility]\par \par Also reports cough that has been ongoing for 6 weeks. Taking Mucinex as it's productive. Negative flu and COVID test. 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 Had been following with her childhood gastroenterologist up until this point for management of her GERD, had been recommended to see Dr Jero Son when her symptoms worsened in October 2020 with newly reported LLQ abdominal pain and worsening diarrhea (4-5x daily, non-bloody). Full workup done at the time, including CT, stools studies, BW. Recommended a colonoscopy then, which was performed in conjunction with an EGD in January 2021. Prior to her EGD/Colonoscopy, she took xifaxin for x 14 days to empirically treat for an infection, which stool studies were negative for any infection at the time. Of note, noted to have a fecal calprotectin of 547. At the time of her first colonoscopy evaluation, she was noted to have UC localized to the rectum. After her colonoscopy, she was started on Mesalamine rectal and Mesalamine tablets (Lialda, 4.8 mg daily). Stopped the mesalamine rectal in June 2021, still noting diarrhea as well as pain however continued with PO mesalamine. First noted blood in her stools in February 2021. \par \par On July 2021, was placed on Budesonide PO in conjunction with with mesalamine PO. Noting going to the bathroom 10+/day. Previously noted to be 4-5 BMs/day. No weight loss up until this point, in fact reporting weight gain (even prior to initiation of Prednisone). July 30,2021, underwent a flex sigmoidoscopy, which revealed severe active colitis in sigmoid and moderate chronic active colitis in the rectum. Both negative for dysplasia and malignancy. Cdiff also collected then which was negative. Reports taking Imodium intermittently which had somewhat helped. Also noted that during the summer, July 2021, noted to become more fatigued and developed fecal incontinence with mucous noted in her stools. Was on Budesonide up until August but continued on Mesalamine. Prednisone 40 mg daily started then, tapered every 5 mg every 5 days until reached 20 mg, stayed on that dose for a while up until she developed anxiety and was decreased to 10 mg daily and now currently on 5 mg daily. \par \par Entyvio was started on 9/3/2021\par PMHx: UC, GERD\par PSHx: None\par Allergies: Tetracycline, Azithromycin (rash), seasonal allergies\par Meds: Oral mesalamine, Entyvio, Dexelint (GERD), Famotidine (GERD)\par SH: Drinks 1-2x/month, never smoker\par FH: UC (Paternal GM, sister), CRC (patient not sure which relative)\par \par  [de-identified] : blood in stools and urgency

## 2022-12-29 NOTE — PHYSICAL EXAM
[Alert] : alert [Normal Voice/Communication] : normal voice/communication [Well Nourished] : well nourished [Normal Appearance] : the appearance of the neck was normal [No Respiratory Distress] : no respiratory distress [No Masses] : no abdominal mass palpated [Abdomen Soft] : soft [Normal Color / Pigmentation] : normal skin color and pigmentation [General Appearance - Alert] : alert [General Appearance - In No Acute Distress] : in no acute distress [General Appearance - Well-Appearing] : healthy appearing [] : normal voice and communication [Oriented To Time, Place, And Person] : oriented to person, place, and time [de-identified] : left quadrant tenderness

## 2023-01-04 ENCOUNTER — APPOINTMENT (OUTPATIENT)
Dept: GASTROENTEROLOGY | Facility: CLINIC | Age: 28
End: 2023-01-04
Payer: COMMERCIAL

## 2023-01-04 VITALS
OXYGEN SATURATION: 98 % | HEART RATE: 114 BPM | DIASTOLIC BLOOD PRESSURE: 80 MMHG | HEIGHT: 67 IN | TEMPERATURE: 97.3 F | WEIGHT: 160 LBS | RESPIRATION RATE: 18 BRPM | BODY MASS INDEX: 25.11 KG/M2 | SYSTOLIC BLOOD PRESSURE: 130 MMHG

## 2023-01-04 PROCEDURE — 99215 OFFICE O/P EST HI 40 MIN: CPT

## 2023-01-09 ENCOUNTER — TRANSCRIPTION ENCOUNTER (OUTPATIENT)
Age: 28
End: 2023-01-09

## 2023-01-09 ENCOUNTER — NON-APPOINTMENT (OUTPATIENT)
Age: 28
End: 2023-01-09

## 2023-01-09 ENCOUNTER — APPOINTMENT (OUTPATIENT)
Dept: COLORECTAL SURGERY | Facility: CLINIC | Age: 28
End: 2023-01-09
Payer: COMMERCIAL

## 2023-01-09 VITALS
SYSTOLIC BLOOD PRESSURE: 119 MMHG | HEART RATE: 105 BPM | DIASTOLIC BLOOD PRESSURE: 79 MMHG | WEIGHT: 160 LBS | HEIGHT: 67 IN | BODY MASS INDEX: 25.11 KG/M2 | TEMPERATURE: 98.4 F

## 2023-01-09 DIAGNOSIS — K51.30 ULCERATIVE (CHRONIC) RECTOSIGMOIDITIS W/OUT COMPLICATIONS: ICD-10-CM

## 2023-01-09 PROCEDURE — 99204 OFFICE O/P NEW MOD 45 MIN: CPT

## 2023-01-09 PROCEDURE — 99214 OFFICE O/P EST MOD 30 MIN: CPT

## 2023-01-09 NOTE — HISTORY OF PRESENT ILLNESS
[FreeTextEntry1] : 26 y/o F presents for initial consultation of ulcerative colitis, patient referred by Dr. Brain Mederos\par Known to SAMUEL Son, referred by SAMUEL Mederos for UC management\par FH: UC (Paternal GM, sister), CRC (patient not sure which relative)\par \par Diagnosed with proctosigmoid UC (Diagnosed 1/29/21) non-responder to Budesonide and rectal Mesalamine, on Prednisone taper in conjunction with PO Mesalamine and q4 weekly Inflectra (started June/22 continued to remain symptomatic), previously failed Entyvio (started 9/2021). Started on Rinvoq and developed pain in the left arm and left leg with neuropathy, drug held until pain improved, reintroduced medication and neuropathy returned.  Went to ED 12/22/22 blood clots/stroke ruled out and advised to hold off on Rinvoq (last dose 12/22).\par \par Has seen two CRS, most recently seen by Dr. Bush on 12/16/22 to discuss surgical options.\par Pt was advised option of total proctocolectomy w/ J pouch, role of ileostomy 2 vs 3 stage approach.\par \par Pt presents today for third opinion\par Per chart review, pt experienced improvement in UC symptoms (30-40 trips/day to the bathroom reduced to 10-20 trips) while on Rinvoq. Mostly passes gas and mucus, passes -- stools \par diet\par \par Spoke w/ NP Roitman regarding plan, labs to be ordered today (will draw in our office) and GI recommends full colonoscopy to further evaluated prior to considering new medications.\par \par Fecal Calpro\par Flexible sigmoidoscopy performed [facility] (4/15/22)\par Impression: \par Segmental mild inflammation found in the rectum in the rectosigmoid colon and in the sigmoid colon secondary to proctosigmoid ulcerative colitis. Biopsied. The examination was otherwise normal. \par \par MRI enterography 2/24/22\par Impression: \par Proctocolitis involving the sigmoid and rectum in a continuous fashion. Remainder of the colon and small bowel are unremarkable. \par Morphology of the ovaries compatible with polycystic ovary syndrome. Recommended clinical and laboratory correlation. \par

## 2023-01-09 NOTE — ASSESSMENT
[FreeTextEntry1] : I had extensive discussion with the patient and her mother regarding her findings on history and examination/work-up.  I have outlined to them the role of surgery in refractory ulcerative colitis–total proctocolectomy with ileal anal J-pouch versus permanent ileostomy.  The options for a two-stage versus 3 stage procedure were detailed.  I am inclined to believe that she will require a two-stage procedure with a total proctocolectomy and ileoanal J-pouch/ileostomy creation followed by closure of ileostomy.  The risk, benefits and alternatives of surgery have been outlined and reviewed including but limited to risk of pouch failure/need for permanent ileostomy/pouchitis, bleeding, infection, anastomotic strictures/leak and need for future procedures all questions answered.

## 2023-01-11 ENCOUNTER — APPOINTMENT (OUTPATIENT)
Dept: NEUROLOGY | Facility: CLINIC | Age: 28
End: 2023-01-11
Payer: COMMERCIAL

## 2023-01-11 ENCOUNTER — APPOINTMENT (OUTPATIENT)
Dept: NEUROLOGY | Facility: CLINIC | Age: 28
End: 2023-01-11

## 2023-01-11 ENCOUNTER — TRANSCRIPTION ENCOUNTER (OUTPATIENT)
Age: 28
End: 2023-01-11

## 2023-01-11 VITALS
DIASTOLIC BLOOD PRESSURE: 71 MMHG | OXYGEN SATURATION: 98 % | WEIGHT: 160 LBS | BODY MASS INDEX: 25.11 KG/M2 | TEMPERATURE: 98.3 F | HEIGHT: 67 IN | HEART RATE: 96 BPM | SYSTOLIC BLOOD PRESSURE: 108 MMHG

## 2023-01-11 DIAGNOSIS — M79.2 NEURALGIA AND NEURITIS, UNSPECIFIED: ICD-10-CM

## 2023-01-11 LAB
ALBUMIN SERPL ELPH-MCNC: 4.6 G/DL
ALP BLD-CCNC: 51 U/L
ALT SERPL-CCNC: 12 U/L
ANION GAP SERPL CALC-SCNC: 17 MMOL/L
AST SERPL-CCNC: 17 U/L
BASOPHILS # BLD AUTO: 0.06 K/UL
BASOPHILS NFR BLD AUTO: 0.4 %
BILIRUB SERPL-MCNC: 0.6 MG/DL
BUN SERPL-MCNC: 12 MG/DL
CALCIUM SERPL-MCNC: 9.7 MG/DL
CHLORIDE SERPL-SCNC: 100 MMOL/L
CO2 SERPL-SCNC: 23 MMOL/L
CREAT SERPL-MCNC: 0.7 MG/DL
CRP SERPL-MCNC: 3 MG/L
EGFR: 121 ML/MIN/1.73M2
EOSINOPHIL # BLD AUTO: 0.03 K/UL
EOSINOPHIL NFR BLD AUTO: 0.2 %
GLUCOSE SERPL-MCNC: 51 MG/DL
HCT VFR BLD CALC: 46.3 %
HGB BLD-MCNC: 14.1 G/DL
IMM GRANULOCYTES NFR BLD AUTO: 0.8 %
LYMPHOCYTES # BLD AUTO: 1.14 K/UL
LYMPHOCYTES NFR BLD AUTO: 8.4 %
MAN DIFF?: NORMAL
MCHC RBC-ENTMCNC: 29.4 PG
MCHC RBC-ENTMCNC: 30.5 GM/DL
MCV RBC AUTO: 96.7 FL
MONOCYTES # BLD AUTO: 0.31 K/UL
MONOCYTES NFR BLD AUTO: 2.3 %
NEUTROPHILS # BLD AUTO: 11.85 K/UL
NEUTROPHILS NFR BLD AUTO: 87.9 %
PLATELET # BLD AUTO: 524 K/UL
POTASSIUM SERPL-SCNC: 4.8 MMOL/L
PROT SERPL-MCNC: 7.1 G/DL
RBC # BLD: 4.79 M/UL
RBC # FLD: 13 %
SODIUM SERPL-SCNC: 140 MMOL/L
WBC # FLD AUTO: 13.5 K/UL

## 2023-01-11 PROCEDURE — 99204 OFFICE O/P NEW MOD 45 MIN: CPT

## 2023-01-11 NOTE — HISTORY OF PRESENT ILLNESS
[FreeTextEntry1] : 28yo F w/ chronic uncontrolled UC (dx 2020) referred for tingling and numbness of the left upper and lower extremities since starting Rinvoq. \par \par She stated that she started experiencing a sharp shooting pain 6/10 in the left forearm 2 days after her first dose of Rinvoq which gradually became a numbness of the entire Left UE and involving the LLE.  At times she would also get tingling sensation in the fingers and great toe of the left extremity. Her symptoms dissipated after stopping the medication and she reported to the ER to r/o DVT. Symptoms return 1-2 days after restarting Rynvoq, however, this time she had tingling of the RUE as well and on Friday she notice blackening of her vision after positional changes from sitting to standing which occurred about 3-4x in 1 day which prompted discontinuation of the medication. Since then she continues to experience mild lingering numbness of her left extremities. She also reported decrease in symptoms w/ activity. \par \par She also reports a very mild left side pressure like HA 1-2/10. she denies falls, stroke, trauma, seizure \par Interrupted sleep due to UC totalling 4-6hrs of sleep on avg. \par poor appetite - poor diet\par walks 30-60mins per day\par no tobacco use, social drinker and no drugs \par \par FMHx:  Aunt w/ MS\par Meds: prednisone recently increase to 20mg po Qd, Mesalamine 4.2g po QD and 2000mg Suppository QD, Dexliant 60mg po 3x/week (GERD)\par \par Reviewed:\par Notes from GI\par Labs - B12 - 392, WBC 13.5, Plt 524, CRP 3

## 2023-01-11 NOTE — END OF VISIT
[] : Resident [FreeTextEntry3] : \par She's had pain and paresthesias in the left upper and lower extremity temporally related to taking rinvoq\par She briefly had similar symptoms in right arm but that resolved; also vision "blacking out' when standing which likely was due to transient hypotension\par Although rinvoq has not been associated with CNS demyelinating disease or peripheral neuropathy, it would be reasonable to switch agents if possible since there was a strong temporal correlation with symptoms and taking the drug. Will also get MRI C spine and brain especially given family history of MS

## 2023-01-11 NOTE — PHYSICAL EXAM
[FreeTextEntry1] : Mental status: Awake, alert and oriented No dysarthria, no aphasia. \par Cranial nerves: Pupils equally round and reactive to light, visual fields full, no nystagmus, extraocular muscles intact, V1 through V3 intact bilaterally and symmetric, face symmetric, hearing intact to finger rub, palate elevation symmetric, tongue was midline.\par Motor: MRC grading 5/5 in b/l UE/LE. Normal tone and bulk. no abnormal movement\par Sensation: Intact and symmetric to light touch, pinprick, Vib, Temp  in all extremities. Pos tinel at left wrist\par Coordination: No dysmetria on finger-to-nose. No dysdiadochokinesia. symmetric finger tapping\par Reflexes: 2+ in bilateral UE/LE, (-) Garay.\par Gait: Narrow, steady, stable Toe, heel walk\par \par

## 2023-01-11 NOTE — CONSULT LETTER
[Dear  ___] : Dear  [unfilled], [Consult Letter:] : I had the pleasure of evaluating your patient, [unfilled]. [Please see my note below.] : Please see my note below. [Consult Closing:] : Thank you very much for allowing me to participate in the care of this patient.  If you have any questions, please do not hesitate to contact me. [Sincerely,] : Sincerely, [FreeTextEntry3] : Kun Mack M.D.\par Neurology, Electromyography and Neuromuscular Medicine\par [facility]\par \par  of Neurology\par [facility] / [facility] [employer]

## 2023-01-11 NOTE — ASSESSMENT
[FreeTextEntry1] : This is a 26yo F w/ Uncontrolled UC who had a recent experience of left extremity paresthesia started after use of Rinvoq. Her symptoms seemed to be slowly subsiding since stopping the medication 4 days ago. There are no published reports of CNS demyelinating disease or peripheral neuropathy and neurologic exam is unremarkable, other that positive Tinel at left wrist. \par \par \par - MRI Brain and C-Spine w/wo Contrast to evaluate for demyelinating disease of CNS\par - will contact Pt when MRI is complete\par - discussed wrist splint  for mild carpal tunnel of the left hand although that would not explain all of her symptoms\par \par

## 2023-01-12 ENCOUNTER — LABORATORY RESULT (OUTPATIENT)
Age: 28
End: 2023-01-12

## 2023-01-15 NOTE — CONSULT LETTER
[Dear  ___] : Dear  [unfilled], [Courtesy Letter:] : I had the pleasure of seeing your patient, [unfilled], in my office today. [Please see my note below.] : Please see my note below. [Consult Closing:] : Thank you very much for allowing me to participate in the care of this patient.  If you have any questions, please do not hesitate to contact me. [Sincerely,] : Sincerely, [FreeTextEntry3] : Brain Mederos MD\par Associate Professor of Medicine\par Chief of GI\par Director IBD Program\par [facility]\par

## 2023-01-15 NOTE — HISTORY OF PRESENT ILLNESS
[Heartburn] : denies heartburn [Nausea] : denies nausea [Diarrhea] : stable diarrhea [Constipation] : denies constipation [Yellow Skin Or Eyes (Jaundice)] : denies jaundice [Abdominal Pain] : stable abdominal pain [Inflammatory Bowel Disease] : inflammatory bowel disease [FreeTextEntry1] : 27 Y F PMHx Proctosigmoid UC (Diagnosed (1/2/9/2021), recently started Rinvoq for 10 days, however developed left arm and left leg neuropathic pain, therefore drug was held until pain improved. Then pt was re-exposed and neuropathic pain has returned and is now in third limb. Not debilitating and patient very focal about not wanting to stop Rinvoq. DVT ruled out in ED last week. Previous non-responder to Budesonide and rectal Mesalamine, q4 weekly Inflectra, previously failed Entyvio (started 9/2021), referred by Dr Jero Son (GI) for management of her UC. Started Inflectra in June, and despite escalated dosing she continued to remain symptomatic. \par Pt accompanied by boyfriend, Karen, and mother on facetime. \par \par Pt reports daily fecal incontinence. Significant urgency. + blood in stool 75% of the time. Having up to 25 BMs per day. + abd pain with BMs. No n/v. Takes tylenol prn. Avoids NSAIDs. + weight loss of 7 lbs (had previously gained weight, perhaps in setting of steroids). Now back on steroids since the summer at least 10mg since; causes sleeplessness and anxiety if on higher dosages. + nocturnal symptoms to have a BM. \par \par While on Rinvoq, she did feel better with nights of full rest without interruption. \par \par EIMs: joint pain all over mostly in hip and low back; rash; no apthous ulcers \par \par Remains on Dexilant for heartburn control. Reports if she's off of it for several days she gets reflux. H2 blockers in between with relief. \par \par Does reports itchy rash throughout skin. Saw derm who said it was psoriasis. Was given topical creams with relief. Dermatologist information: [facility]\par \par Also reports cough that has been ongoing for 6 weeks. Taking Mucinex as it's productive. Negative flu and COVID test. 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 Had been following with her childhood gastroenterologist up until this point for management of her GERD, had been recommended to see Dr Jero Son when her symptoms worsened in October 2020 with newly reported LLQ abdominal pain and worsening diarrhea (4-5x daily, non-bloody). Full workup done at the time, including CT, stools studies, BW. Recommended a colonoscopy then, which was performed in conjunction with an EGD in January 2021. Prior to her EGD/Colonoscopy, she took xifaxin for x 14 days to empirically treat for an infection, which stool studies were negative for any infection at the time. Of note, noted to have a fecal calprotectin of 547. At the time of her first colonoscopy evaluation, she was noted to have UC localized to the rectum. After her colonoscopy, she was started on Mesalamine rectal and Mesalamine tablets (Lialda, 4.8 mg daily). Stopped the mesalamine rectal in June 2021, still noting diarrhea as well as pain however continued with PO mesalamine. First noted blood in her stools in February 2021. \par \par On July 2021, was placed on Budesonide PO in conjunction with with mesalamine PO. Noting going to the bathroom 10+/day. Previously noted to be 4-5 BMs/day. No weight loss up until this point, in fact reporting weight gain (even prior to initiation of Prednisone). July 30,2021, underwent a flex sigmoidoscopy, which revealed severe active colitis in sigmoid and moderate chronic active colitis in the rectum. Both negative for dysplasia and malignancy. Cdiff also collected then which was negative. Reports taking Imodium intermittently which had somewhat helped. Also noted that during the summer, July 2021, noted to become more fatigued and developed fecal incontinence with mucous noted in her stools. Was on Budesonide up until August but continued on Mesalamine. Prednisone 40 mg daily started then, tapered every 5 mg every 5 days until reached 20 mg, stayed on that dose for a while up until she developed anxiety and was decreased to 10 mg daily and now currently on 5 mg daily. \par \par Entyvio was started on 9/3/2021\par PMHx: UC, GERD\par PSHx: None\par Allergies: Tetracycline, Azithromycin (rash), seasonal allergies\par Meds: Oral mesalamine, Entyvio, Dexelint (GERD), Famotidine (GERD)\par SH: Drinks 1-2x/month, never smoker\par FH: UC (Paternal GM, sister), CRC (patient not sure which relative)\par \par  [de-identified] : blood in stools and urgency

## 2023-01-15 NOTE — ASSESSMENT
[FreeTextEntry1] : 27 Y F PMHx Proctosigmoid UC (Diagnosed (1/2/9/2021), non-responder to Budesonide and rectal Mesalamine, on Prednisone in conjunction with PO Mesalamine and q 4 weekly Inflectra since June 2022; 8 weekly Entyvio (started 9/2021), last seen 02/2022, referred by Dr Jero Son (GI) for management of her UC. She felt clinically better on Rinvoq after 10 days, but did notice worsening left arm and left leg pain with neuropathy. ED ruled out blood clot. Improved since stopping Rinvoq and then recurred with re-exposure. Accompanied by boyfriend in person, and mother on facetime, extesive discussion re: approach to meds/adr/near term and medium term.\par \par #UC Proctosigmoiditis. \par - Diagnosed Jan 2021, s/p rectal & PO Mesalamine, non-responder --> PO Mesalmine & Budesonide, non-responder, PO Mesalamine & prednisone no benefit (refractory to steroids) ---> recently on PO Mesalamine & Inflectra q 4 weeks with no response; previous non-responder VDZ; of note, Rinvoq with some improvement however has only been on for about 16 days\par - last cscope April 2022 by Dr. Jero Son - likely needs repeat endoscopic evaluation to evaluate disease acivity before any drug treatment or surgical decisions are made \par - transitioned to Rinvoq, and despite improvement, she had possible ADR of "neuropathic pain" which has now improved off treatment; had detail discussion regarding stopping therapy verse continuing; pt is adamaent about continuing medicine knowing risk of worsening neuropathy and possibly irreversible; pt admits pain is bearable and prefers that to flaring; will set up with neurologist for further testing and intervention; of note no sensation or msk changes \par - counseled to remain on steroids for now - pt self-tapered to 10mg due to anxiety and insomnia \par - Patients initial symptoms of non-bloody diarrhea, abdominal pain, an atypical presentation of UC, we had recommended an MRE to r/o any small bowel disease - Negative, only showed proctosigmoiditis. Now patient has been having bleeding \par - Continue with PO Mesalamine and IN for now\par - referral to Colorectal team - already met with Dr. Leon and Dr. Bush ; pending appt with  next week \par \par New onset neuropathic pain in setting of Rinvoq \par - likely ADR of drug - discussed r/b of continuing treatment and referring to Neuro; in meantime advised we can stop Rinvoq or drop dose from 45mg to 30mg\par - no other known triggers\par - b12 392\par - s/p ED visit, ruled out DVT\par - monitor, if worsened stop rinvoq\par \par Cryptosporidium detected - d/w with ID that assay returning high rates of false positive, pt notified; pt is s/p treatment \par \par HCM\par - cont f/u therapist for depression\par - received varicella vaccine as child, no need for shingles vaccine\par - will need to  on flu/pna vaccine at next visit\par - will need DEXA scan\par - no tobacco use or NSAID use\par - b12, iron and vit d when stable GI symptoms \par \par F/U 2 weeks to discuss symptoms and plan for cscope

## 2023-01-15 NOTE — PHYSICAL EXAM
[Alert] : alert [Normal Voice/Communication] : normal voice/communication [Well Nourished] : well nourished [Normal Appearance] : the appearance of the neck was normal [No Respiratory Distress] : no respiratory distress [No Masses] : no abdominal mass palpated [Abdomen Soft] : soft [Normal Color / Pigmentation] : normal skin color and pigmentation [General Appearance - Alert] : alert [General Appearance - In No Acute Distress] : in no acute distress [General Appearance - Well-Appearing] : healthy appearing [] : normal voice and communication [Oriented To Time, Place, And Person] : oriented to person, place, and time [de-identified] : left quadrant tenderness

## 2023-01-17 ENCOUNTER — APPOINTMENT (OUTPATIENT)
Dept: GASTROENTEROLOGY | Facility: HOSPITAL | Age: 28
End: 2023-01-17

## 2023-01-17 ENCOUNTER — TRANSCRIPTION ENCOUNTER (OUTPATIENT)
Age: 28
End: 2023-01-17

## 2023-01-17 ENCOUNTER — OUTPATIENT (OUTPATIENT)
Dept: OUTPATIENT SERVICES | Facility: HOSPITAL | Age: 28
LOS: 1 days | Discharge: HOME | End: 2023-01-17
Payer: COMMERCIAL

## 2023-01-17 ENCOUNTER — RESULT REVIEW (OUTPATIENT)
Age: 28
End: 2023-01-17

## 2023-01-17 VITALS
TEMPERATURE: 99 F | WEIGHT: 160.06 LBS | HEIGHT: 67 IN | RESPIRATION RATE: 18 BRPM | HEART RATE: 102 BPM | OXYGEN SATURATION: 97 % | DIASTOLIC BLOOD PRESSURE: 82 MMHG | SYSTOLIC BLOOD PRESSURE: 114 MMHG

## 2023-01-17 LAB
C DIFF GDH STL QL: NEGATIVE — SIGNIFICANT CHANGE UP
C DIFF GDH STL QL: SIGNIFICANT CHANGE UP

## 2023-01-17 PROCEDURE — 88305 TISSUE EXAM BY PATHOLOGIST: CPT | Mod: 26

## 2023-01-17 PROCEDURE — 87324 CLOSTRIDIUM AG IA: CPT

## 2023-01-17 PROCEDURE — 45380 COLONOSCOPY AND BIOPSY: CPT

## 2023-01-17 PROCEDURE — 87449 NOS EACH ORGANISM AG IA: CPT

## 2023-01-17 PROCEDURE — 88305 TISSUE EXAM BY PATHOLOGIST: CPT

## 2023-01-17 NOTE — PACU DISCHARGE NOTE - COMMENTS
Patient meets discharge criteria at this time, but will continue to be observed in the recovery room for 30 to 60 minutes to ensure proper recovery.

## 2023-01-17 NOTE — PRE-ANESTHESIA EVALUATION ADULT - NSANTHINPATMEDSFT_GEN_ALL_CORE
Prednisone  Escitalopram  Dexilant  Mesalamine  Zofran  Pepcid  (Rinvoq) stopped for side effect  Entyvio  Inflectra

## 2023-01-17 NOTE — PRE-ANESTHESIA EVALUATION ADULT - NSANTHPMHFT_GEN_ALL_CORE
UC  GERD  Chronic Cough  Neuropathic pain 2/2 Rinvoq UC  GERD  Chronic Cough  Neuropathic pain 2/2 Rinvoq  Tachycardia

## 2023-01-18 ENCOUNTER — TRANSCRIPTION ENCOUNTER (OUTPATIENT)
Age: 28
End: 2023-01-18

## 2023-01-18 LAB — SURGICAL PATHOLOGY STUDY: SIGNIFICANT CHANGE UP

## 2023-01-18 RX ORDER — UPADACITINIB 45 MG/1
45 TABLET, EXTENDED RELEASE ORAL
Qty: 56 | Refills: 0 | Status: DISCONTINUED | COMMUNITY
Start: 2022-11-30 | End: 2023-01-18

## 2023-01-20 ENCOUNTER — APPOINTMENT (OUTPATIENT)
Dept: GASTROENTEROLOGY | Facility: CLINIC | Age: 28
End: 2023-01-20
Payer: COMMERCIAL

## 2023-01-20 PROCEDURE — 99213 OFFICE O/P EST LOW 20 MIN: CPT | Mod: 95

## 2023-01-23 ENCOUNTER — OUTPATIENT (OUTPATIENT)
Dept: OUTPATIENT SERVICES | Facility: HOSPITAL | Age: 28
LOS: 1 days | End: 2023-01-23
Payer: COMMERCIAL

## 2023-01-23 ENCOUNTER — TRANSCRIPTION ENCOUNTER (OUTPATIENT)
Age: 28
End: 2023-01-23

## 2023-01-23 ENCOUNTER — APPOINTMENT (OUTPATIENT)
Dept: INFUSION THERAPY | Facility: CLINIC | Age: 28
End: 2023-01-23

## 2023-01-23 VITALS
TEMPERATURE: 98 F | RESPIRATION RATE: 18 BRPM | DIASTOLIC BLOOD PRESSURE: 72 MMHG | SYSTOLIC BLOOD PRESSURE: 106 MMHG | HEART RATE: 83 BPM | OXYGEN SATURATION: 98 %

## 2023-01-23 DIAGNOSIS — K51.90 ULCERATIVE COLITIS, UNSPECIFIED, WITHOUT COMPLICATIONS: ICD-10-CM

## 2023-01-23 LAB
ALBUMIN SERPL ELPH-MCNC: 4.3 G/DL — SIGNIFICANT CHANGE UP (ref 3.3–5)
ALP SERPL-CCNC: 52 U/L — SIGNIFICANT CHANGE UP (ref 40–120)
ALT FLD-CCNC: 10 U/L — SIGNIFICANT CHANGE UP (ref 10–45)
ANION GAP SERPL CALC-SCNC: 11 MMOL/L — SIGNIFICANT CHANGE UP (ref 5–17)
AST SERPL-CCNC: 16 U/L — SIGNIFICANT CHANGE UP (ref 10–40)
BILIRUB SERPL-MCNC: 0.4 MG/DL — SIGNIFICANT CHANGE UP (ref 0.2–1.2)
BUN SERPL-MCNC: 9 MG/DL — SIGNIFICANT CHANGE UP (ref 7–23)
CALCIUM SERPL-MCNC: 9.5 MG/DL — SIGNIFICANT CHANGE UP (ref 8.4–10.5)
CHLORIDE SERPL-SCNC: 99 MMOL/L — SIGNIFICANT CHANGE UP (ref 96–108)
CO2 SERPL-SCNC: 28 MMOL/L — SIGNIFICANT CHANGE UP (ref 22–31)
CREAT SERPL-MCNC: 0.66 MG/DL — SIGNIFICANT CHANGE UP (ref 0.5–1.3)
CRP SERPL-MCNC: <3 MG/L — SIGNIFICANT CHANGE UP (ref 0–4)
EGFR: 122 ML/MIN/1.73M2 — SIGNIFICANT CHANGE UP
GLUCOSE SERPL-MCNC: 90 MG/DL — SIGNIFICANT CHANGE UP (ref 70–99)
HCT VFR BLD CALC: 44.3 % — SIGNIFICANT CHANGE UP (ref 34.5–45)
HGB BLD-MCNC: 14.4 G/DL — SIGNIFICANT CHANGE UP (ref 11.5–15.5)
MCHC RBC-ENTMCNC: 30.1 PG — SIGNIFICANT CHANGE UP (ref 27–34)
MCHC RBC-ENTMCNC: 32.5 GM/DL — SIGNIFICANT CHANGE UP (ref 32–36)
MCV RBC AUTO: 92.5 FL — SIGNIFICANT CHANGE UP (ref 80–100)
NRBC # BLD: 0 /100 WBCS — SIGNIFICANT CHANGE UP (ref 0–0)
PLATELET # BLD AUTO: 388 K/UL — SIGNIFICANT CHANGE UP (ref 150–400)
POTASSIUM SERPL-MCNC: 3.8 MMOL/L — SIGNIFICANT CHANGE UP (ref 3.5–5.3)
POTASSIUM SERPL-SCNC: 3.8 MMOL/L — SIGNIFICANT CHANGE UP (ref 3.5–5.3)
PROT SERPL-MCNC: 7.1 G/DL — SIGNIFICANT CHANGE UP (ref 6–8.3)
RBC # BLD: 4.79 M/UL — SIGNIFICANT CHANGE UP (ref 3.8–5.2)
RBC # FLD: 13.2 % — SIGNIFICANT CHANGE UP (ref 10.3–14.5)
SODIUM SERPL-SCNC: 138 MMOL/L — SIGNIFICANT CHANGE UP (ref 135–145)
WBC # BLD: 15.07 K/UL — HIGH (ref 3.8–10.5)
WBC # FLD AUTO: 15.07 K/UL — HIGH (ref 3.8–10.5)

## 2023-01-23 PROCEDURE — 96413 CHEMO IV INFUSION 1 HR: CPT

## 2023-01-23 PROCEDURE — 86140 C-REACTIVE PROTEIN: CPT

## 2023-01-23 PROCEDURE — 85027 COMPLETE CBC AUTOMATED: CPT

## 2023-01-23 PROCEDURE — 36415 COLL VENOUS BLD VENIPUNCTURE: CPT

## 2023-01-23 PROCEDURE — 80053 COMPREHEN METABOLIC PANEL: CPT

## 2023-01-23 RX ORDER — USTEKINUMAB 45 MG/.5ML
390 INJECTION, SOLUTION SUBCUTANEOUS ONCE
Refills: 0 | Status: COMPLETED | OUTPATIENT
Start: 2023-01-23 | End: 2023-01-23

## 2023-01-23 RX ADMIN — USTEKINUMAB 250 MILLIGRAM(S): 45 INJECTION, SOLUTION SUBCUTANEOUS at 11:10

## 2023-01-23 RX ADMIN — USTEKINUMAB 390 MILLIGRAM(S): 45 INJECTION, SOLUTION SUBCUTANEOUS at 12:15

## 2023-01-25 ENCOUNTER — TRANSCRIPTION ENCOUNTER (OUTPATIENT)
Age: 28
End: 2023-01-25

## 2023-01-26 ENCOUNTER — TRANSCRIPTION ENCOUNTER (OUTPATIENT)
Age: 28
End: 2023-01-26

## 2023-01-27 ENCOUNTER — TRANSCRIPTION ENCOUNTER (OUTPATIENT)
Age: 28
End: 2023-01-27

## 2023-01-30 ENCOUNTER — TRANSCRIPTION ENCOUNTER (OUTPATIENT)
Age: 28
End: 2023-01-30

## 2023-01-31 ENCOUNTER — TRANSCRIPTION ENCOUNTER (OUTPATIENT)
Age: 28
End: 2023-01-31

## 2023-01-31 ENCOUNTER — OUTPATIENT (OUTPATIENT)
Dept: OUTPATIENT SERVICES | Facility: HOSPITAL | Age: 28
LOS: 1 days | End: 2023-01-31
Payer: COMMERCIAL

## 2023-01-31 ENCOUNTER — APPOINTMENT (OUTPATIENT)
Dept: MRI IMAGING | Facility: HOSPITAL | Age: 28
End: 2023-01-31

## 2023-01-31 PROCEDURE — A9585: CPT

## 2023-01-31 PROCEDURE — 70553 MRI BRAIN STEM W/O & W/DYE: CPT

## 2023-01-31 PROCEDURE — 70553 MRI BRAIN STEM W/O & W/DYE: CPT | Mod: 26

## 2023-01-31 PROCEDURE — 72156 MRI NECK SPINE W/O & W/DYE: CPT | Mod: 26

## 2023-01-31 PROCEDURE — 72156 MRI NECK SPINE W/O & W/DYE: CPT

## 2023-02-01 ENCOUNTER — APPOINTMENT (OUTPATIENT)
Dept: GASTROENTEROLOGY | Facility: CLINIC | Age: 28
End: 2023-02-01
Payer: COMMERCIAL

## 2023-02-01 PROCEDURE — 99214 OFFICE O/P EST MOD 30 MIN: CPT | Mod: 95

## 2023-02-01 RX ORDER — USTEKINUMAB 90 MG/ML
90 INJECTION, SOLUTION SUBCUTANEOUS
Qty: 1 | Refills: 3 | Status: ACTIVE | COMMUNITY
Start: 2023-01-31 | End: 1900-01-01

## 2023-02-01 NOTE — ASSESSMENT
[FreeTextEntry1] : 27 Y F PMHx Proctosigmoid UC (Diagnosed (1/2/9/2021), non-responder to Budesonide and rectal Mesalamine, on Prednisone in conjunction with PO Mesalamine and q 4 weekly Inflectra since June 2022; 8 weekly Entyvio (started 9/2021), last seen 02/2022, referred by Dr Jero Son (GI) for management of her UC. She felt clinically better on Rinvoq after 10 days, but did notice worsening left arm and left leg pain with neuropathy. ED ruled out blood clot. Improved since stopping Rinvoq and then recurred with re-exposure. Last visit had extensive discussion re: approach to meds/adr/near term and medium term. She is now on 30mg prednisone with persistent symptoms, planning to begin Stelara in 3 days. \par \par #UC Proctosigmoiditis. \par - Diagnosed Jan 2021, s/p rectal & PO Mesalamine, non-responder --> PO Mesalamine & Budesonide, non-responder, PO Mesalamine & prednisone no benefit (refractory to steroids) ---> recently on PO Mesalamine & Inflectra q 4 weeks with no response; previous non-responder VDZ; of note, Rinvoq with some improvement however had only been on for 16 days and then developed neuropathic pain \par - recent cscope reviewed - weinberg 3 in rectum, weinberg 2 from 20-40 cm, and normal colon proximal to that\par - transitioned to Rinvoq, and despite improvement, she had possible ADR of "neuropathic pain" which has now improved off treatment; being f/b neurologist for further testing and intervention; of note no sensation or msk changes \par - counseled to remain on steroids for now - inc to 40mg if needed\par - Patients initial symptoms of non-bloody diarrhea, abdominal pain, an atypical presentation of UC, we had recommended an MRE to r/o any small bowel disease - Negative, only showed proctosigmoiditis. Now patient has been having bleeding \par - Continue with PO Mesalamine and KS for now\par -met with Dr. Leon, Dr. Lai and  Dr. Bush; pt would like to avoid surgery at this time but she feels knowledgeable \par - plan for Stelara IV on 1/23/23\par \par New onset neuropathic pain in setting of Rinvoq \par - likely ADR of drug - being f/b Dr. Mack \par - no other known triggers\par - b12 392\par - s/p ED visit, ruled out DVT\par \par HCM\par - cont f/u therapist for depression\par - received varicella vaccine as child, no need for shingles vaccine\par - will need to  on flu/pna vaccine at next visit\par - will need DEXA scan\par - no tobacco use or NSAID use\par - b12, iron and vit d when stable GI symptoms \par \par F/U after Stelara infusion

## 2023-02-01 NOTE — HISTORY OF PRESENT ILLNESS
[Home] : at home, [unfilled] , at the time of the visit. [Medical Office: ([facility])___] : at the medical office located in  [Verbal consent obtained from patient] : the patient, [unfilled] [Heartburn] : denies heartburn [Nausea] : denies nausea [Diarrhea] : stable diarrhea [Constipation] : denies constipation [Yellow Skin Or Eyes (Jaundice)] : denies jaundice [Abdominal Pain] : stable abdominal pain [Inflammatory Bowel Disease] : inflammatory bowel disease [FreeTextEntry1] : 27 Y F PMHx Proctosigmoid UC (Diagnosed (1/2/9/2021), recently started Rinvoq for 10 days, however developed left arm and left leg neuropathic pain, therefore drug was held until pain improved. Then pt was re-exposed and neuropathic pain returned and in third limb. Improved off Rinvoq. DVT previously ruled out in ED, now s/p MRI ordered by Dr. Mack, pending results. Previous non-responder to Budesonide and rectal Mesalamine, q4 weekly Inflectra, previously failed Entyvio (started 9/2021), referred by Dr Jero Son (GI) for management of her UC. Started Stelara on 1/23/23 with no improvement of symptoms, now with worse bleeding despite Prednisone 40mg - slight improvement in stool frequency. \par \par Now on Prednisone 40mg since Monday with slight improvement in bowel movements overall - having about 10 BMs per day but worsening blood in stool. Did have episode of urgency/incontinence on way home from [place] today. She is more anxious, using Ativan daily. She is inquiring about coming into hospital.  \par \par At previous visit: Pt reports daily fecal incontinence. Significant urgency. + blood in stool 75% of the time. Having up to 25 BMs per day. + abd pain with BMs. No n/v. Takes tylenol prn. Avoids NSAIDs. + weight loss of 7 lbs (had previously gained weight, perhaps in setting of steroids). Now back on steroids since the summer at least 10mg since; causes sleeplessness and anxiety if on higher dosages. + nocturnal symptoms to have a BM. \par \par While on Rinvoq, she did feel better with nights of full rest without interruption. \par \par EIMs: joint pain all over mostly in hip and low back; rash; no apthous ulcers \par \par Remains on Dexilant for heartburn control. Reports if she's off of it for several days she gets reflux. H2 blockers in between with relief. \par \par Does reports itchy rash throughout skin. Saw derm who said it was psoriasis. Was given topical creams with relief. Dermatologist information: [facility]\par \par Also reports cough that has been ongoing for 6 weeks. Taking Mucinex as it's productive. Negative flu and COVID test. 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 Had been following with her childhood gastroenterologist up until this point for management of her GERD, had been recommended to see Dr Jero Son when her symptoms worsened in October 2020 with newly reported LLQ abdominal pain and worsening diarrhea (4-5x daily, non-bloody). Full workup done at the time, including CT, stools studies, BW. Recommended a colonoscopy then, which was performed in conjunction with an EGD in January 2021. Prior to her EGD/Colonoscopy, she took xifaxin for x 14 days to empirically treat for an infection, which stool studies were negative for any infection at the time. Of note, noted to have a fecal calprotectin of 547. At the time of her first colonoscopy evaluation, she was noted to have UC localized to the rectum. After her colonoscopy, she was started on Mesalamine rectal and Mesalamine tablets (Lialda, 4.8 mg daily). Stopped the mesalamine rectal in June 2021, still noting diarrhea as well as pain however continued with PO mesalamine. First noted blood in her stools in February 2021. \par \par On July 2021, was placed on Budesonide PO in conjunction with with mesalamine PO. Noting going to the bathroom 10+/day. Previously noted to be 4-5 BMs/day. No weight loss up until this point, in fact reporting weight gain (even prior to initiation of Prednisone). July 30,2021, underwent a flex sigmoidoscopy, which revealed severe active colitis in sigmoid and moderate chronic active colitis in the rectum. Both negative for dysplasia and malignancy. Cdiff also collected then which was negative. Reports taking Imodium intermittently which had somewhat helped. Also noted that during the summer, July 2021, noted to become more fatigued and developed fecal incontinence with mucous noted in her stools. Was on Budesonide up until August but continued on Mesalamine. Prednisone 40 mg daily started then, tapered every 5 mg every 5 days until reached 20 mg, stayed on that dose for a while up until she developed anxiety and was decreased to 10 mg daily and now currently on 5 mg daily. \par \par Entyvio was started on 9/3/2021\par PMHx: UC, GERD\par PSHx: None\par Allergies: Tetracycline, Azithromycin (rash), seasonal allergies\par Meds: Oral mesalamine, Entyvio, Dexelint (GERD), Famotidine (GERD)\par SH: Drinks 1-2x/month, never smoker\par FH: UC (Paternal GM, sister), CRC (patient not sure which relative)\par \par  [de-identified] : blood in stools and urgency

## 2023-02-01 NOTE — HISTORY OF PRESENT ILLNESS
[Home] : at home, [unfilled] , at the time of the visit. [Medical Office: ([facility])___] : at the medical office located in  [Verbal consent obtained from patient] : the patient, [unfilled] [Heartburn] : denies heartburn [Nausea] : denies nausea [Diarrhea] : stable diarrhea [Constipation] : denies constipation [Yellow Skin Or Eyes (Jaundice)] : denies jaundice [Abdominal Pain] : stable abdominal pain [Inflammatory Bowel Disease] : inflammatory bowel disease [FreeTextEntry1] : 27 Y F PMHx Proctosigmoid UC (Diagnosed (1/2/9/2021), recently started Rinvoq for 10 days, however developed left arm and left leg neuropathic pain, therefore drug was held until pain improved. Then pt was re-exposed and neuropathic pain has returned and is now in third limb. Not debilitating and patient very focal about not wanting to stop Rinvoq. DVT ruled out in ED last week. Previous non-responder to Budesonide and rectal Mesalamine, q4 weekly Inflectra, previously failed Entyvio (started 9/2021), referred by Dr Jero Son (GI) for management of her UC. Started Inflectra in June, and despite escalated dosing she continued to remain symptomatic. \par Telemed to discuss transition to Stelara. Pt on 30mg prednisone. S/P CSCOPE.\par \par CSCOPE 1/17/23:\par Findings: \par  \par Additional findings The rectum (0-20cm)had weinberg 3 inflammation with large \par serpiginous ulcers and exudate. The areas from 20-40cm had weinberg 2 discontinuous \par inflammation with friability and exudate. Proximal to 40cm, the colon was \par unremarkable to the AO which had small cecal patch. The TI was \par unremarkable..Multiple cold forceps biopsies were performed for histology. \par  \par Impressions: \par  \par The rectum (0-20cm)had weinberg 3 inflammation with large serpiginous ulcers and \par exudate. The areas from 20-40cm had weinberg 2 discontinuous inflammation with \par friability and exudate. Proximal to 40cm, the colon was unremarkable to the AO \par which had small cecal patch. The TI was unremarkable. (Biopsy). \par  \par PATH: 1. Terminal ileum, biopsy:\par - Small intestinal mucosa without significant histologic abnormalities.\par \par \par 2. Right colon, biopsy:\par - Colonic mucosa with mild active colitis.\par \par 3. Colon, @40cm, biopsy:\par - Colonic mucosa with very focal acute inflammation.\par \par Pt reports daily fecal incontinence. Significant urgency. + blood in stool 75% of the time. Having up to 25 BMs per day. + abd pain with BMs. No n/v. Takes tylenol prn. Avoids NSAIDs. + weight loss of 7 lbs (had previously gained weight, perhaps in setting of steroids). Now back on steroids since the summer at least 10mg since; causes sleeplessness and anxiety if on higher dosages. + nocturnal symptoms to have a BM. \par \par While on Rinvoq, she did feel better with nights of full rest without interruption. \par \par EIMs: joint pain all over mostly in hip and low back; rash; no apthous ulcers \par \par Remains on Dexilant for heartburn control. Reports if she's off of it for several days she gets reflux. H2 blockers in between with relief. \par \par Does reports itchy rash throughout skin. Saw derm who said it was psoriasis. Was given topical creams with relief. Dermatologist information: [facility]\par \par Also reports cough that has been ongoing for 6 weeks. Taking Mucinex as it's productive. Negative flu and COVID test. 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 Had been following with her childhood gastroenterologist up until this point for management of her GERD, had been recommended to see Dr Jero Son when her symptoms worsened in October 2020 with newly reported LLQ abdominal pain and worsening diarrhea (4-5x daily, non-bloody). Full workup done at the time, including CT, stools studies, BW. Recommended a colonoscopy then, which was performed in conjunction with an EGD in January 2021. Prior to her EGD/Colonoscopy, she took xifaxin for x 14 days to empirically treat for an infection, which stool studies were negative for any infection at the time. Of note, noted to have a fecal calprotectin of 547. At the time of her first colonoscopy evaluation, she was noted to have UC localized to the rectum. After her colonoscopy, she was started on Mesalamine rectal and Mesalamine tablets (Lialda, 4.8 mg daily). Stopped the mesalamine rectal in June 2021, still noting diarrhea as well as pain however continued with PO mesalamine. First noted blood in her stools in February 2021. \par \par On July 2021, was placed on Budesonide PO in conjunction with with mesalamine PO. Noting going to the bathroom 10+/day. Previously noted to be 4-5 BMs/day. No weight loss up until this point, in fact reporting weight gain (even prior to initiation of Prednisone). July 30,2021, underwent a flex sigmoidoscopy, which revealed severe active colitis in sigmoid and moderate chronic active colitis in the rectum. Both negative for dysplasia and malignancy. Cdiff also collected then which was negative. Reports taking Imodium intermittently which had somewhat helped. Also noted that during the summer, July 2021, noted to become more fatigued and developed fecal incontinence with mucous noted in her stools. Was on Budesonide up until August but continued on Mesalamine. Prednisone 40 mg daily started then, tapered every 5 mg every 5 days until reached 20 mg, stayed on that dose for a while up until she developed anxiety and was decreased to 10 mg daily and now currently on 5 mg daily. \par \par Entyvio was started on 9/3/2021\par PMHx: UC, GERD\par PSHx: None\par Allergies: Tetracycline, Azithromycin (rash), seasonal allergies\par Meds: Oral mesalamine, Entyvio, Dexelint (GERD), Famotidine (GERD)\par SH: Drinks 1-2x/month, never smoker\par FH: UC (Paternal GM, sister), CRC (patient not sure which relative)\par \par  [de-identified] : blood in stools and urgency

## 2023-02-01 NOTE — CONSULT LETTER
[Dear  ___] : Dear  [unfilled], [Courtesy Letter:] : I had the pleasure of seeing your patient, [unfilled], in my office today. [Please see my note below.] : Please see my note below. [Consult Closing:] : Thank you very much for allowing me to participate in the care of this patient.  If you have any questions, please do not hesitate to contact me. [Sincerely,] : Sincerely, [FreeTextEntry3] : Brain Mederos MD\par Associate Professor of Medicine\par Chief of GI\par Director IBD Program\par [facility]\par

## 2023-02-01 NOTE — ASSESSMENT
[FreeTextEntry1] : 27 Y F PMHx Proctosigmoid UC (Diagnosed (1/2/9/2021), non-responder to Budesonide and rectal Mesalamine, on Prednisone 40mg in conjunction with PO Mesalamine and q 4 weekly Inflectra since June 2022; 8 weekly Entyvio (started 9/2021), referred by Dr Jero Son (GI) for management of her UC. She felt clinically better on Rinvoq after 10 days, but did notice worsening left arm and left leg pain with neuropathy. ED ruled out blood clot. Improved since stopping Rinvoq and then recurred with re-exposure. Now being f/b Dr. Mack. Now s/p 1st infusion of Stelara with no improvement of symptoms. 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, flaring despite 40mg prednisone, received IV stelara on 1/23/23\par - Diagnosed Jan 2021, s/p rectal & PO Mesalamine, non-responder --> PO Mesalmine & Budesonide, non-responder, PO Mesalamine & prednisone no benefit (refractory to steroids) ---> recently on PO Mesalamine & Inflectra q 4 weeks with no response; previous non-responder VDZ; of note, Rinvoq with some improvement however had only been on for about 16 days with worsening neuro symptoms of arm and leg pain/numbness \par - recent cscope showing weinberg 3 inflammation with large ulcers in rectum and weinberg 2 from 20-40cm, proximal to that normal \par - pt reports bleeding is worse, slight improvement in bowel frequency on prednisone (10x/day) but is having insomnia, anxiety from prednisone 40mg and had fecal incontinence today \par - plan for earlier dose of Stelara at week 4 \par - continue prednisone 40mg if able to tolerate, ca+vit d\par - pt inquiring about coming to ED if symptoms do not improve tomorrow; discussed in detail that surgery may likely be next best step given intolerance to steroids due to anxiety and this is patient's fourth medication she is trialing for UC; pt verbalized understanding and wants to remain on 40mg prednisone, receive 4th dose of Stelara, and then plan for surgery outpatient if no improvement and unable to taper off steroids \par - Patients initial symptoms of non-bloody diarrhea, abdominal pain, an atypical presentation of UC, we had recommended an MRE to r/o any small bowel disease - Negative, only showed proctosigmoiditis. Now patient has been having bleeding \par - Continue with PO Mesalamine and IN for now\par - referral to Colorectal team - already met with Dr. Leon , Dr. aLi and Dr. Bush\par \par New onset neuropathic pain in setting of Rinvoq \par - likely ADR of drug - improved off drug but some lingering symptoms of difficulty moving arms\par - underwent brain MRI yesterday ,pending results as being worked up by Dr. Mack \par - no other known triggers\par - b12 392\par - s/p ED visit, ruled out DVT\par \par Anxiety\par - reports due to higher dose prednisone\par - using Ativan PRN x 2 days (once/day) as well as remains on Lexapro rx'd by PCP ; advised she d/w her PCP any dose adjustments \par - she received medical marijuana delivery and will trial tonight to see if her anxiety/pain improves as marijuana has helped previously \par - pending visit with Taty Lea at [facility] end of February \par - referral to [facility] to connect with Psych \par \par HCM\par - cont f/u therapist for depression\par - received varicella vaccine as child, no need for shingles vaccine\par - will need to  on flu/pna vaccine at next visit\par - will need DEXA scan\par - no tobacco use or NSAID use\par - b12, iron and vit d when stable GI symptoms \par \par F/U 2 weeks for stelara dose at 4 weeks , sooner if indicated \par \par Plan d/w Dr. Mederos

## 2023-02-02 ENCOUNTER — TRANSCRIPTION ENCOUNTER (OUTPATIENT)
Age: 28
End: 2023-02-02

## 2023-02-06 ENCOUNTER — TRANSCRIPTION ENCOUNTER (OUTPATIENT)
Age: 28
End: 2023-02-06

## 2023-02-10 ENCOUNTER — TRANSCRIPTION ENCOUNTER (OUTPATIENT)
Age: 28
End: 2023-02-10

## 2023-02-10 ENCOUNTER — APPOINTMENT (OUTPATIENT)
Dept: INTERNAL MEDICINE | Facility: CLINIC | Age: 28
End: 2023-02-10

## 2023-02-14 ENCOUNTER — APPOINTMENT (OUTPATIENT)
Dept: GASTROENTEROLOGY | Facility: CLINIC | Age: 28
End: 2023-02-14

## 2023-02-16 ENCOUNTER — APPOINTMENT (OUTPATIENT)
Dept: GASTROENTEROLOGY | Facility: CLINIC | Age: 28
End: 2023-02-16
Payer: COMMERCIAL

## 2023-02-16 VITALS
HEART RATE: 98 BPM | DIASTOLIC BLOOD PRESSURE: 76 MMHG | TEMPERATURE: 98.2 F | RESPIRATION RATE: 18 BRPM | OXYGEN SATURATION: 98 % | SYSTOLIC BLOOD PRESSURE: 121 MMHG | WEIGHT: 162 LBS | BODY MASS INDEX: 25.43 KG/M2 | HEIGHT: 67 IN

## 2023-02-16 PROCEDURE — 99214 OFFICE O/P EST MOD 30 MIN: CPT | Mod: 25

## 2023-02-16 PROCEDURE — 96372 THER/PROPH/DIAG INJ SC/IM: CPT

## 2023-02-16 RX ORDER — USTEKINUMAB 90 MG/ML
90 INJECTION, SOLUTION SUBCUTANEOUS
Refills: 0 | Status: COMPLETED | OUTPATIENT
Start: 2023-02-16

## 2023-02-16 RX ORDER — MESALAMINE 1.2 G/1
1.2 TABLET, DELAYED RELEASE ORAL
Qty: 360 | Refills: 4 | Status: ACTIVE | COMMUNITY
Start: 2022-05-16 | End: 1900-01-01

## 2023-02-16 RX ORDER — BUDESONIDE 28 MG/1
2 AEROSOL, FOAM RECTAL
Qty: 2 | Refills: 3 | Status: ACTIVE | COMMUNITY
Start: 2023-02-16 | End: 1900-01-01

## 2023-02-16 RX ADMIN — USTEKINUMAB 0 MG/ML: 90 INJECTION, SOLUTION SUBCUTANEOUS at 00:00

## 2023-02-18 NOTE — CONSULT LETTER
[Dear  ___] : Dear  [unfilled], [Courtesy Letter:] : I had the pleasure of seeing your patient, [unfilled], in my office today. [Please see my note below.] : Please see my note below. [Consult Closing:] : Thank you very much for allowing me to participate in the care of this patient.  If you have any questions, please do not hesitate to contact me. [Sincerely,] : Sincerely, [FreeTextEntry3] : Brain Mederos MD\par Associate Professor of Medicine\par Chief of GI\par Director IBD Program\par [facility]\par

## 2023-02-18 NOTE — ASSESSMENT
[FreeTextEntry1] : 27 Y F PMHx Proctosigmoid UC (Diagnosed (1/2/9/2021), non-responder to Budesonide and rectal Mesalamine, on Prednisone 40mg in conjunction with PO Mesalamine and Stelara (first IV infusion 4 weeks ago); previously on q 4 weekly Inflectra since June 2022; 8 weekly Entyvio (started 9/2021), referred by Dr Jero Son (GI) for management of her UC and Rinvoq. She felt clinically better on Rinvoq after 10 days, but did notice worsening left arm and left leg pain with neuropathy. ED ruled out blood clot. Improved since stopping Rinvoq and then recurred with re-exposure. Now being f/b Dr. Mack with normal brain MRI. Now s/p 1st infusion of Stelara with minimal improvement but unclear if from prednisone. 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, flaring despite 40mg prednisone, received IV stelara on 1/23/23\par - pt has been counseled on surgical options and she has now seen several surgeons - she recently saw Dr. Lang and will consider surgery if Stelara is not successful; pt understands prednisone places her at higher risk for complications of surgery and has been advised she will taper prior to planned procedure\par - Diagnosed Jan 2021, s/p rectal & PO Mesalamine, non-responder --> PO Mesalmine & Budesonide, non-responder, PO Mesalamine & prednisone no benefit (refractory to steroids) ---> recently on PO Mesalamine & Inflectra q 4 weeks with no response; previous non-responder VDZ; of note, Rinvoq with some improvement however had only been on for about 16 days with worsening neuro symptoms of arm and leg pain/numbness \par - recent cscope showing weinberg 3 inflammation with large ulcers in rectum and weinberg 2 from 20-40cm, proximal to that normal \par - pt reports bleeding is worse, slight improvement in bowel frequency on prednisone (10x/day) but is having insomnia, anxiety from prednisone 40mg and had fecal incontinence today - does report medical marijuana helps significantly with pain and sleep \par - plan for earlier dose of Stelara at week 4 today , and again in 4 weeks to assess response \par - continue prednisone 40mg if able to tolerate, ca+vit d and begin taper once symptoms improve or she has surgical date \par - Patients initial symptoms of non-bloody diarrhea, abdominal pain, an atypical presentation of UC, we had recommended an MRE to r/o any small bowel disease - Negative, only showed proctosigmoiditis. Now patient has been having bleeding \par - Continue with PO Mesalamine and MO for now - add Uceris foam which she has never taken \par - referral to Colorectal team - already met with Dr. Leon , Dr. Lai and Dr. Bush and most recently Dr. Lang who she wants to pursue surgery if she goes that route\par \par New onset neuropathic pain in setting of Rinvoq  - improved off of treatment \par - likely ADR of drug - improved off drug but some lingering symptoms of difficulty moving arms\par - underwent brain MRI which was normal \par - no other known triggers\par - b12 392\par - s/p ED visit, ruled out DVT\par \par Anxiety\par - reports due to higher dose prednisone\par - using Ativan PRN and medical marijuana with relief \par - pending visit with Taty Lea at [facility] next week \par - referral to [facility] to connect with Psych - has appt with psychiatrist in March \par \par HCM\par - cont f/u therapist for depression\par - received varicella vaccine as child, no need for shingles vaccine\par - will need to  on flu/pna vaccine at next visit\par - will need DEXA scan\par - no tobacco use or NSAID use\par - b12, iron and vit d when stable GI symptoms \par \par F/U 4 weeks in person for next Stelara dose - pt prefers to come in again for injection demonstration

## 2023-02-18 NOTE — PHYSICAL EXAM
[Alert] : alert [Normal Voice/Communication] : normal voice/communication [Healthy Appearing] : healthy appearing [No Acute Distress] : no acute distress [Sclera] : the sclera and conjunctiva were normal [Hearing Threshold Finger Rub Not [place]] : hearing was normal [Normal Lips/Gums] : the lips and gums were normal [Oropharynx] : the oropharynx was normal [Normal Appearance] : the appearance of the neck was normal [No Neck Mass] : no neck mass was observed [No Respiratory Distress] : no respiratory distress [No Acc Muscle Use] : no accessory muscle use [Respiration, Rhythm And Depth] : normal respiratory rhythm and effort [Auscultation Breath Sounds / Voice Sounds] : lungs were clear to auscultation bilaterally [Heart Rate And Rhythm] : heart rate was normal and rhythm regular [Normal S1, S2] : normal S1 and S2 [Murmurs] : no murmurs [Bowel Sounds] : normal bowel sounds [No Masses] : no abdominal mass palpated [Abdomen Soft] : soft [] : no hepatosplenomegaly [Oriented To Time, Place, And Person] : oriented to person, place, and time [de-identified] : slght tenderness to deep palpation

## 2023-02-18 NOTE — HISTORY OF PRESENT ILLNESS
[Heartburn] : denies heartburn [Nausea] : denies nausea [Diarrhea] : stable diarrhea [Constipation] : denies constipation [Yellow Skin Or Eyes (Jaundice)] : denies jaundice [Abdominal Pain] : stable abdominal pain [Inflammatory Bowel Disease] : inflammatory bowel disease [FreeTextEntry1] : 27 Y F PMHx Proctosigmoid UC (Diagnosed (1/2/9/2021), now on Stelara s/p 1 loading dose and steroid dependent (on 40mg prednisone); started Rinvoq for 10 days, however developed left arm and left leg neuropathic pain, therefore drug was held until pain improved. Then pt was re-exposed and neuropathic pain returned and in third limb. Improved off Rinvoq. DVT previously ruled out in ED, now s/p MRI ordered by Dr. Mack, which was normal. Previous non-responder to Budesonide and rectal Mesalamine, q4 weekly Inflectra, previously failed Entyvio (started 9/2021), referred by Dr Jero Son (GI) for management of her UC. Started Stelara on 1/23/23 with very mild improvement, remains on 40mg prednisone.\par \par Pt reports 10-15 BMs per day - if she takes imodium she has more relief. Having longer stretches in the afternoon (can go 4-5 hours with no BM) which is an improvement. Still having abd pain. Near incontinence. Blood in 90% of stools. More anxious from steroids - using Ativan daily. Since last visit met with Dr. Lang. Also noting some myalgia type complaints. + significant urgency, incontinence but not daily. \par \par + nausea, no vomiting. Takes tylenol prn. Avoids NSAIDs. + weight loss of 7 lbs (had previously gained weight, perhaps in setting of steroids). \par \par Using medical marijuana at night for pain and sleep. \par \par While on Rinvoq, she did feel better with nights of full rest without interruption but was stopped due to adverse effect. \par \par EIMs: joint pain all over mostly in hip and low back; rash; no apthous ulcers \par \par Remains on Dexilant for heartburn control. Reports if she's off of it for several days she gets reflux. H2 blockers in between with relief. \par \par Does reports itchy rash throughout skin. Saw derm who said it was psoriasis. Was given topical creams with relief. Dermatologist information: [facility]\par \par Also reports cough that has been ongoing for 6 weeks. Taking Mucinex as it's productive. Negative flu and COVID test. 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 Had been following with her childhood gastroenterologist up until this point for management of her GERD, had been recommended to see Dr Jero Son when her symptoms worsened in October 2020 with newly reported LLQ abdominal pain and worsening diarrhea (4-5x daily, non-bloody). Full workup done at the time, including CT, stools studies, BW. Recommended a colonoscopy then, which was performed in conjunction with an EGD in January 2021. Prior to her EGD/Colonoscopy, she took xifaxin for x 14 days to empirically treat for an infection, which stool studies were negative for any infection at the time. Of note, noted to have a fecal calprotectin of 547. At the time of her first colonoscopy evaluation, she was noted to have UC localized to the rectum. After her colonoscopy, she was started on Mesalamine rectal and Mesalamine tablets (Lialda, 4.8 mg daily). Stopped the mesalamine rectal in June 2021, still noting diarrhea as well as pain however continued with PO mesalamine. First noted blood in her stools in February 2021. \par \par On July 2021, was placed on Budesonide PO in conjunction with with mesalamine PO. Noting going to the bathroom 10+/day. Previously noted to be 4-5 BMs/day. No weight loss up until this point, in fact reporting weight gain (even prior to initiation of Prednisone). July 30,2021, underwent a flex sigmoidoscopy, which revealed severe active colitis in sigmoid and moderate chronic active colitis in the rectum. Both negative for dysplasia and malignancy. Cdiff also collected then which was negative. Reports taking Imodium intermittently which had somewhat helped. Also noted that during the summer, July 2021, noted to become more fatigued and developed fecal incontinence with mucous noted in her stools. Was on Budesonide up until August but continued on Mesalamine. Prednisone 40 mg daily started then, tapered every 5 mg every 5 days until reached 20 mg, stayed on that dose for a while up until she developed anxiety and was decreased to 10 mg daily and now currently on 5 mg daily. \par \par Entyvio was started on 9/3/2021\par PMHx: UC, GERD\par PSHx: None\par Allergies: Tetracycline, Azithromycin (rash), seasonal allergies\par Meds: Oral mesalamine, Entyvio, Dexelint (GERD), Famotidine (GERD)\par SH: Drinks 1-2x/month, never smoker\par FH: UC (Paternal GM, sister), CRC (patient not sure which relative)\par \par  [de-identified] : blood in stools and urgency

## 2023-02-22 ENCOUNTER — TRANSCRIPTION ENCOUNTER (OUTPATIENT)
Age: 28
End: 2023-02-22

## 2023-02-22 DIAGNOSIS — R20.2 PARESTHESIA OF SKIN: ICD-10-CM

## 2023-02-24 ENCOUNTER — APPOINTMENT (OUTPATIENT)
Dept: INTERNAL MEDICINE | Facility: CLINIC | Age: 28
End: 2023-02-24

## 2023-02-24 ENCOUNTER — TRANSCRIPTION ENCOUNTER (OUTPATIENT)
Age: 28
End: 2023-02-24

## 2023-03-06 ENCOUNTER — APPOINTMENT (OUTPATIENT)
Dept: PSYCHIATRY | Facility: TELEHEALTH | Age: 28
End: 2023-03-06
Payer: COMMERCIAL

## 2023-03-06 DIAGNOSIS — F41.0 GENERALIZED ANXIETY DISORDER: ICD-10-CM

## 2023-03-06 DIAGNOSIS — F41.1 GENERALIZED ANXIETY DISORDER: ICD-10-CM

## 2023-03-06 DIAGNOSIS — F06.33: ICD-10-CM

## 2023-03-06 PROCEDURE — 90792 PSYCH DIAG EVAL W/MED SRVCS: CPT | Mod: 95

## 2023-03-06 NOTE — REASON FOR VISIT
[Telehealth (audio & video) - Individual/Group] : This visit was provided via telehealth using real-time 2-way audio visual technology. [Other Location: e.g. Home (Enter Location, City,State)___] : The provider was located at [unfilled]. [Home] : The patient, [unfilled], was located at home, [unfilled], at the time of the visit. [Other:___] : [unfilled] [[facility] Provider/Facility] : [facility] Provider/Facility [FreeTextEntry2] : consultation for anxiety in the context of UC treatment [FreeTextEntry1] : "I have had ulcerative colitis"

## 2023-03-06 NOTE — PAST MEDICAL HISTORY
[FreeTextEntry1] : GERD\par UC\par Medications: dexlant and pepcid for GERD, Stellara, mezalamine, Uceris lexapro, vitamin suipplements

## 2023-03-06 NOTE — HISTORY OF PRESENT ILLNESS
[FreeTextEntry1] : Seen via telehealth from a private location in our respective residences. \par \par Reports she has been on prednisone for 1 year. When on prednisone, experiences racing thoughts "brain doesn't want to shut up" which cause difficulty sleeping and concentrating, feels like she is "talking too much" and feels "hyperactive" as well as "varela" and irritable. Also reports feels very anxious, tossing and turning at night, worry. Reports feeling very tired during day with low energy due to sleep deprivation. Reports she takes Ativan 1mg prn for sleep and anxiety. Reports this helps significantly. She has also tried Unisom and Benadryl as well for sleep. Reports it helps somewhat, but not as effective as Ativan. Reports at most she would need the Ativan 2-3 times a week. \par \par Reports she may be having adrenal suppression from being on prednisone for so long. \par Reports she has been on lexapro 10mg for about 3-4 months which was initially introduced as a pain management option by her PCP but patient feels it has not helped her pain but it has helped her with her anxiety. Reports it has helped her to feel "more at ease," better able to handle responsibilities, \par Reports "mild" depression in the context of ulcerative colitis and denies anhedonia. Reports she is homebound due to the UC and related bowel incontinence. It makes her anxious. Denies panic attacks but endorses agoraphobia - plans all the outings around the bathroom availability. \par She is having her colon removed soon as a treatment for UC. \par \par Denies sucidal ideation, intent or plan, or violent ideation, intent or plan. Not currently trying for a baby. Denies history of trauma, eating disorder. Denies obsessions or compulsions.  [FreeTextEntry2] : Has been seeing a therapist (IBD psychologist). \par Has a history of being on Ritalin

## 2023-03-06 NOTE — DISCUSSION/SUMMARY
[FreeTextEntry1] : Suicide and violence risk assessment were performed. \par \par - Modifiable risk factors: ongoing anxiety, chronic pain, use of prednisone, passive SI \par \par - Unmodifiable risk factors: none \par \par - Protective factors: seeking care, therapeutic relationships with PMD,  resilient, domiciled, some family support, future oriented, no suicidal intent, no history of suicide attempt, no history of NSSIB, no violent intent/plan, no known access to firearms,  medically stable, future oriented, hopeful. \par \par Protective factors mitigate risk and the patient is clinically appropriate for outpatient care. Modifiable risk factors are being addressed as below.

## 2023-03-10 ENCOUNTER — TRANSCRIPTION ENCOUNTER (OUTPATIENT)
Age: 28
End: 2023-03-10

## 2023-03-13 ENCOUNTER — TRANSCRIPTION ENCOUNTER (OUTPATIENT)
Age: 28
End: 2023-03-13

## 2023-03-14 ENCOUNTER — TRANSCRIPTION ENCOUNTER (OUTPATIENT)
Age: 28
End: 2023-03-14

## 2023-03-15 ENCOUNTER — TRANSCRIPTION ENCOUNTER (OUTPATIENT)
Age: 28
End: 2023-03-15

## 2023-03-16 ENCOUNTER — APPOINTMENT (OUTPATIENT)
Dept: GASTROENTEROLOGY | Facility: CLINIC | Age: 28
End: 2023-03-16
Payer: COMMERCIAL

## 2023-03-16 VITALS
HEART RATE: 101 BPM | OXYGEN SATURATION: 98 % | TEMPERATURE: 98 F | DIASTOLIC BLOOD PRESSURE: 72 MMHG | HEIGHT: 67 IN | BODY MASS INDEX: 26.21 KG/M2 | RESPIRATION RATE: 18 BRPM | SYSTOLIC BLOOD PRESSURE: 122 MMHG | WEIGHT: 167 LBS

## 2023-03-16 PROCEDURE — 99214 OFFICE O/P EST MOD 30 MIN: CPT

## 2023-03-16 RX ORDER — PREDNISONE 5 MG/1
5 TABLET ORAL DAILY
Qty: 60 | Refills: 1 | Status: ACTIVE | COMMUNITY
Start: 2021-12-17 | End: 1900-01-01

## 2023-03-16 RX ORDER — MESALAMINE 1000 MG/1
1000 SUPPOSITORY RECTAL
Qty: 60 | Refills: 0 | Status: DISCONTINUED | COMMUNITY
Start: 2022-10-13 | End: 2023-03-16

## 2023-03-19 NOTE — HISTORY OF PRESENT ILLNESS
[Heartburn] : denies heartburn [Nausea] : denies nausea [Diarrhea] : stable diarrhea [Constipation] : denies constipation [Yellow Skin Or Eyes (Jaundice)] : denies jaundice [Abdominal Pain] : stable abdominal pain [Inflammatory Bowel Disease] : inflammatory bowel disease [FreeTextEntry1] : 27 Y F PMHx Proctosigmoid UC (Diagnosed (1/2/9/2021), now on Stelara s/p 1 maintenance dose and steroid dependent (on 25mg prednisone) unable to taper lower; started Rinvoq for 10 days, however developed left arm and left leg neuropathic pain, therefore drug was held until pain improved. Then pt was re-exposed and neuropathic pain returned and in third limb. Improved off Rinvoq. DVT previously ruled out in ED, now s/p MRI ordered by Dr. Mack,  which was normal. Previous non-responder to Budesonide and rectal Mesalamine, q4 weekly Inflectra, previously failed Entyvio (started 9/2021), referred by Dr Jero Son (GI) for management of her UC. Now planning for total colectomy with plan for J pouch with Dr. Olson on 3/24. Here today for Stelara injection as was advised by Surgery to continue. \par \par Pt reports 10-15 BMs per day - if she takes imodium she has more relief. Having longer stretches in the afternoon (can go 4-5 hours with no BM) which is an improvement. Still having abd pain. Near incontinence. Blood in 90% of stools. More anxious from steroids - using Ativan daily. Since last visit met with Dr. Lang. Also noting some myalgia type complaints. + significant urgency, incontinence but not daily. \par \par + nausea, no vomiting. Takes tylenol prn. Avoids NSAIDs. gained weight in setting of steroids. \par \par Using medical marijuana at night for pain and sleep. \par \par While on Rinvoq, she did feel better with nights of full rest without interruption but was stopped due to adverse effect. \par \par EIMs: joint pain all over mostly in hip and low back; rash; no apthous ulcers \par \par Remains on Dexilant for heartburn control. Reports if she's off of it for several days she gets reflux. H2 blockers in between with relief. \par \par Does reports itchy rash throughout skin. Saw derm who said it was psoriasis. Was given topical creams with relief. Dermatologist information: [facility]\par \par Also reports cough that has been ongoing for 6 weeks. Taking Mucinex as it's productive. Negative flu and COVID test. 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 Had been following with her childhood gastroenterologist up until this point for management of her GERD, had been recommended to see Dr Jero Son when her symptoms worsened in October 2020 with newly reported LLQ abdominal pain and worsening diarrhea (4-5x daily, non-bloody). Full workup done at the time, including CT, stools studies, BW. Recommended a colonoscopy then, which was performed in conjunction with an EGD in January 2021. Prior to her EGD/Colonoscopy, she took xifaxin for x 14 days to empirically treat for an infection, which stool studies were negative for any infection at the time. Of note, noted to have a fecal calprotectin of 547. At the time of her first colonoscopy evaluation, she was noted to have UC localized to the rectum. After her colonoscopy, she was started on Mesalamine rectal and Mesalamine tablets (Lialda, 4.8 mg daily). Stopped the mesalamine rectal in June 2021, still noting diarrhea as well as pain however continued with PO mesalamine. First noted blood in her stools in February 2021. \par \par On July 2021, was placed on Budesonide PO in conjunction with with mesalamine PO. Noting going to the bathroom 10+/day. Previously noted to be 4-5 BMs/day. No weight loss up until this point, in fact reporting weight gain (even prior to initiation of Prednisone). July 30,2021, underwent a flex sigmoidoscopy, which revealed severe active colitis in sigmoid and moderate chronic active colitis in the rectum. Both negative for dysplasia and malignancy. Cdiff also collected then which was negative. Reports taking Imodium intermittently which had somewhat helped. Also noted that during the summer, July 2021, noted to become more fatigued and developed fecal incontinence with mucous noted in her stools. Was on Budesonide up until August but continued on Mesalamine. Prednisone 40 mg daily started then, tapered every 5 mg every 5 days until reached 20 mg, stayed on that dose for a while up until she developed anxiety and was decreased to 10 mg daily and now currently on 5 mg daily. \par \par Entyvio was started on 9/3/2021\par PMHx: UC, GERD\par PSHx: None\par Allergies: Tetracycline, Azithromycin (rash), seasonal allergies\par Meds: Oral mesalamine, Entyvio, Dexelint (GERD), Famotidine (GERD)\par SH: Drinks 1-2x/month, never smoker\par FH: UC (Paternal GM, sister), CRC (patient not sure which relative)\par \par  [de-identified] : blood in stools and urgency

## 2023-03-19 NOTE — ASSESSMENT
[FreeTextEntry1] : 27 Y F PMHx Proctosigmoid UC (Diagnosed (1/2/9/2021), now on Stelara s/p 1 maintenance dose and steroid dependent (on 25mg prednisone) unable to taper lower; started Rinvoq for 10 days, however developed left arm and left leg neuropathic pain, therefore drug was held until pain improved. Then pt was re-exposed and neuropathic pain returned and in third limb. Improved off Rinvoq. DVT previously ruled out in ED, now s/p MRI ordered by Dr. Mack,  which was normal. Previous non-responder to Budesonide and rectal Mesalamine, q4 weekly Inflectra, previously failed Entyvio (started 9/2021), referred by Dr Jero Son (GI) for management of her UC. Now planning for total colectomy with plan for J pouch with Dr. Olson on 3/24. Here today for Stelara injection as was advised by Surgery to continue. \par \par #UC Proctosigmoiditis - flaring despite 25mg prednisone, and 12 weeks Stelara \par - pt has been counseled on surgical options and she has now seen several surgeons - she has planned first stage of pouch surgery on 3/24 with Dr. lOson at [facility]; she has begun her prednisone taper but was unable to taper off 25mg ; pt understands prednisone places her at higher risk for complications of surgery and is in communication with surgical team; plan is to be set up with Endocrinology as well for taper post-op\par - discussed fertility and pregnancy planning with patient regarding J pouch; she will plan for ileostomy and either freeze eggs or get pregnant before J pouch - pt will see fertility specialist after colectomy to discuss further \par - last cscope showing weinberg 3 inflammation with large ulcers in rectum and weinberg 2 from 20-40cm, proximal to that normal \par - Patients initial symptoms of non-bloody diarrhea, abdominal pain, an atypical presentation of UC, we had recommended an MRE to r/o any small bowel disease - Negative, only showed proctosigmoiditis. Now patient has been having bleeding \par \par New onset neuropathic pain in setting of Rinvoq  - improved off of treatment \par - likely ADR of drug - improved off drug but some lingering symptoms of difficulty moving arms\par - underwent brain MRI which was normal \par - no other known triggers\par - b12 392\par - s/p ED visit, ruled out DVT\par \par Anxiety\par - reports due to higher dose prednisone\par - using Ativan PRN and medical marijuana with relief \par - met with Taty Lea at [facility] as well as [facility] psych with recommendations to inc lexapro dose and cont ativan \par - referred to Faby Moscoso  completed \par \par HCM\par - cont f/u therapist for depression\par - received varicella vaccine as child, no need for shingles vaccine\par - will need to  on flu/pna vaccine at next visit\par - will need DEXA scan\par - no tobacco use or NSAID use\par - b12, iron and vit d when stable GI symptoms \par \par F/U 3 mo after surgery, sooner if indicated

## 2023-03-19 NOTE — CONSULT LETTER
[Courtesy Letter:] : I had the pleasure of seeing your patient, [unfilled], in my office today. [Please see my note below.] : Please see my note below. [Consult Closing:] : Thank you very much for allowing me to participate in the care of this patient.  If you have any questions, please do not hesitate to contact me. [Sincerely,] : Sincerely, [Dear  ___] : Dear  [unfilled], [FreeTextEntry3] : Brain Mederos MD\par Associate Professor of Medicine\par Chief of GI\par Director IBD Program\par [facility]\par  [DrSandra  ___] : Dr. GABRIEL

## 2023-03-19 NOTE — PHYSICAL EXAM
[Alert] : alert [Normal Voice/Communication] : normal voice/communication [Healthy Appearing] : healthy appearing [No Acute Distress] : no acute distress [Sclera] : the sclera and conjunctiva were normal [Hearing Threshold Finger Rub Not [place]] : hearing was normal [Normal Lips/Gums] : the lips and gums were normal [Oropharynx] : the oropharynx was normal [Normal Appearance] : the appearance of the neck was normal [No Neck Mass] : no neck mass was observed [No Respiratory Distress] : no respiratory distress [No Acc Muscle Use] : no accessory muscle use [Respiration, Rhythm And Depth] : normal respiratory rhythm and effort [Heart Rate And Rhythm] : heart rate was normal and rhythm regular [Auscultation Breath Sounds / Voice Sounds] : lungs were clear to auscultation bilaterally [Normal S1, S2] : normal S1 and S2 [Murmurs] : no murmurs [Bowel Sounds] : normal bowel sounds [No Masses] : no abdominal mass palpated [Abdomen Soft] : soft [] : no hepatosplenomegaly [Oriented To Time, Place, And Person] : oriented to person, place, and time [de-identified] : slght tenderness to deep palpation

## 2023-03-30 ENCOUNTER — NON-APPOINTMENT (OUTPATIENT)
Age: 28
End: 2023-03-30

## 2023-04-03 ENCOUNTER — NON-APPOINTMENT (OUTPATIENT)
Age: 28
End: 2023-04-03

## 2023-04-18 ENCOUNTER — APPOINTMENT (OUTPATIENT)
Dept: NEUROLOGY | Facility: CLINIC | Age: 28
End: 2023-04-18

## 2023-05-30 ENCOUNTER — NON-APPOINTMENT (OUTPATIENT)
Age: 28
End: 2023-05-30

## 2023-05-31 ENCOUNTER — NON-APPOINTMENT (OUTPATIENT)
Age: 28
End: 2023-05-31

## 2023-06-14 ENCOUNTER — APPOINTMENT (OUTPATIENT)
Dept: GASTROENTEROLOGY | Facility: CLINIC | Age: 28
End: 2023-06-14

## 2023-07-29 ENCOUNTER — NON-APPOINTMENT (OUTPATIENT)
Age: 28
End: 2023-07-29

## 2023-08-02 ENCOUNTER — NON-APPOINTMENT (OUTPATIENT)
Age: 28
End: 2023-08-02
